# Patient Record
Sex: FEMALE | Race: WHITE | NOT HISPANIC OR LATINO | Employment: OTHER | URBAN - METROPOLITAN AREA
[De-identification: names, ages, dates, MRNs, and addresses within clinical notes are randomized per-mention and may not be internally consistent; named-entity substitution may affect disease eponyms.]

---

## 2017-01-12 ENCOUNTER — OFFICE VISIT - HEALTHEAST (OUTPATIENT)
Dept: CARDIOLOGY | Facility: CLINIC | Age: 82
End: 2017-01-12

## 2017-01-12 DIAGNOSIS — E78.2 MIXED HYPERLIPIDEMIA: ICD-10-CM

## 2017-01-12 DIAGNOSIS — I10 ESSENTIAL HYPERTENSION: ICD-10-CM

## 2017-01-12 DIAGNOSIS — I25.10 CORONARY ARTERY DISEASE INVOLVING NATIVE CORONARY ARTERY OF NATIVE HEART WITHOUT ANGINA PECTORIS: ICD-10-CM

## 2017-01-12 ASSESSMENT — MIFFLIN-ST. JEOR: SCORE: 1259.1

## 2017-06-09 ASSESSMENT — MIFFLIN-ST. JEOR: SCORE: 1233.25

## 2017-06-10 ENCOUNTER — ANESTHESIA - HEALTHEAST (OUTPATIENT)
Dept: SURGERY | Facility: HOSPITAL | Age: 82
End: 2017-06-10

## 2017-06-10 ENCOUNTER — SURGERY - HEALTHEAST (OUTPATIENT)
Dept: SURGERY | Facility: HOSPITAL | Age: 82
End: 2017-06-10

## 2017-06-14 ENCOUNTER — OFFICE VISIT - HEALTHEAST (OUTPATIENT)
Dept: GERIATRICS | Facility: CLINIC | Age: 82
End: 2017-06-14

## 2017-06-14 DIAGNOSIS — H35.30 MACULAR DEGENERATION: ICD-10-CM

## 2017-06-14 DIAGNOSIS — S72.001D CLOSED FRACTURE OF RIGHT HIP WITH ROUTINE HEALING: ICD-10-CM

## 2017-06-14 DIAGNOSIS — K59.00 CONSTIPATION, UNSPECIFIED CONSTIPATION TYPE: ICD-10-CM

## 2017-06-14 DIAGNOSIS — E78.5 DYSLIPIDEMIA: ICD-10-CM

## 2017-06-14 DIAGNOSIS — D64.9 ANEMIA, UNSPECIFIED TYPE: ICD-10-CM

## 2017-06-14 DIAGNOSIS — I10 ESSENTIAL HYPERTENSION: ICD-10-CM

## 2017-06-14 DIAGNOSIS — E11.8 TYPE 2 DIABETES MELLITUS WITH COMPLICATION, UNSPECIFIED LONG TERM INSULIN USE STATUS: ICD-10-CM

## 2017-06-14 DIAGNOSIS — I25.810 CORONARY ARTERY DISEASE INVOLVING CORONARY BYPASS GRAFT OF NATIVE HEART WITHOUT ANGINA PECTORIS: ICD-10-CM

## 2017-06-16 ENCOUNTER — OFFICE VISIT - HEALTHEAST (OUTPATIENT)
Dept: GERIATRICS | Facility: CLINIC | Age: 82
End: 2017-06-16

## 2017-06-16 DIAGNOSIS — S72.001D CLOSED FRACTURE OF RIGHT HIP WITH ROUTINE HEALING: ICD-10-CM

## 2017-06-16 DIAGNOSIS — D64.9 ANEMIA, UNSPECIFIED TYPE: ICD-10-CM

## 2017-06-16 DIAGNOSIS — I10 ESSENTIAL HYPERTENSION: ICD-10-CM

## 2017-06-20 ENCOUNTER — OFFICE VISIT - HEALTHEAST (OUTPATIENT)
Dept: GERIATRICS | Facility: CLINIC | Age: 82
End: 2017-06-20

## 2017-06-20 DIAGNOSIS — Z95.1 S/P CABG X 3: ICD-10-CM

## 2017-06-20 DIAGNOSIS — I10 ESSENTIAL HYPERTENSION: ICD-10-CM

## 2017-06-20 DIAGNOSIS — S72.001D CLOSED FRACTURE OF RIGHT HIP WITH ROUTINE HEALING: ICD-10-CM

## 2017-06-20 DIAGNOSIS — E11.8 TYPE 2 DIABETES MELLITUS WITH COMPLICATION, UNSPECIFIED LONG TERM INSULIN USE STATUS: ICD-10-CM

## 2017-06-23 ENCOUNTER — OFFICE VISIT - HEALTHEAST (OUTPATIENT)
Dept: GERIATRICS | Facility: CLINIC | Age: 82
End: 2017-06-23

## 2017-06-23 DIAGNOSIS — I25.810 CORONARY ARTERY DISEASE INVOLVING CORONARY BYPASS GRAFT OF NATIVE HEART WITHOUT ANGINA PECTORIS: ICD-10-CM

## 2017-06-23 DIAGNOSIS — D64.9 ANEMIA, UNSPECIFIED TYPE: ICD-10-CM

## 2017-06-23 DIAGNOSIS — I10 ESSENTIAL HYPERTENSION WITH GOAL BLOOD PRESSURE LESS THAN 140/90: ICD-10-CM

## 2017-06-23 DIAGNOSIS — S72.001D CLOSED FRACTURE OF RIGHT HIP WITH ROUTINE HEALING: ICD-10-CM

## 2017-06-27 ENCOUNTER — OFFICE VISIT - HEALTHEAST (OUTPATIENT)
Dept: GERIATRICS | Facility: CLINIC | Age: 82
End: 2017-06-27

## 2017-06-27 DIAGNOSIS — E11.8 TYPE 2 DIABETES MELLITUS WITH COMPLICATION, UNSPECIFIED LONG TERM INSULIN USE STATUS: ICD-10-CM

## 2017-06-27 DIAGNOSIS — I25.810 CORONARY ARTERY DISEASE INVOLVING CORONARY BYPASS GRAFT OF NATIVE HEART WITHOUT ANGINA PECTORIS: ICD-10-CM

## 2017-06-27 DIAGNOSIS — S72.001D CLOSED FRACTURE OF RIGHT HIP WITH ROUTINE HEALING: ICD-10-CM

## 2017-06-27 DIAGNOSIS — R53.1 WEAKNESS: ICD-10-CM

## 2017-06-30 ENCOUNTER — OFFICE VISIT - HEALTHEAST (OUTPATIENT)
Dept: GERIATRICS | Facility: CLINIC | Age: 82
End: 2017-06-30

## 2017-06-30 DIAGNOSIS — R53.1 WEAKNESS: ICD-10-CM

## 2017-06-30 DIAGNOSIS — I10 ESSENTIAL HYPERTENSION WITH GOAL BLOOD PRESSURE LESS THAN 140/90: ICD-10-CM

## 2017-06-30 DIAGNOSIS — D64.9 ANEMIA, UNSPECIFIED TYPE: ICD-10-CM

## 2017-06-30 DIAGNOSIS — E11.8 TYPE 2 DIABETES MELLITUS WITH COMPLICATION, UNSPECIFIED LONG TERM INSULIN USE STATUS: ICD-10-CM

## 2017-06-30 DIAGNOSIS — I25.810 CORONARY ARTERY DISEASE INVOLVING CORONARY BYPASS GRAFT OF NATIVE HEART WITHOUT ANGINA PECTORIS: ICD-10-CM

## 2017-06-30 DIAGNOSIS — S72.001D CLOSED FRACTURE OF RIGHT HIP WITH ROUTINE HEALING: ICD-10-CM

## 2017-07-03 ENCOUNTER — AMBULATORY - HEALTHEAST (OUTPATIENT)
Dept: GERIATRICS | Facility: CLINIC | Age: 82
End: 2017-07-03

## 2017-08-14 ENCOUNTER — RECORDS - HEALTHEAST (OUTPATIENT)
Dept: LAB | Facility: CLINIC | Age: 82
End: 2017-08-14

## 2017-08-14 LAB
CHOLEST SERPL-MCNC: 145 MG/DL
FASTING STATUS PATIENT QL REPORTED: NO
HDLC SERPL-MCNC: 37 MG/DL
LDLC SERPL CALC-MCNC: 33 MG/DL
TRIGL SERPL-MCNC: 373 MG/DL

## 2018-01-11 ENCOUNTER — COMMUNICATION - HEALTHEAST (OUTPATIENT)
Dept: ADMINISTRATIVE | Facility: CLINIC | Age: 83
End: 2018-01-11

## 2018-02-08 ENCOUNTER — RECORDS - HEALTHEAST (OUTPATIENT)
Dept: ADMINISTRATIVE | Facility: OTHER | Age: 83
End: 2018-02-08

## 2018-02-08 ENCOUNTER — AMBULATORY - HEALTHEAST (OUTPATIENT)
Dept: CARDIOLOGY | Facility: CLINIC | Age: 83
End: 2018-02-08

## 2018-04-04 ENCOUNTER — RECORDS - HEALTHEAST (OUTPATIENT)
Dept: LAB | Facility: CLINIC | Age: 83
End: 2018-04-04

## 2018-04-04 LAB
ANION GAP SERPL CALCULATED.3IONS-SCNC: 13 MMOL/L (ref 5–18)
BUN SERPL-MCNC: 14 MG/DL (ref 8–28)
CALCIUM SERPL-MCNC: 9.8 MG/DL (ref 8.5–10.5)
CHLORIDE BLD-SCNC: 106 MMOL/L (ref 98–107)
CO2 SERPL-SCNC: 23 MMOL/L (ref 22–31)
CREAT SERPL-MCNC: 0.96 MG/DL (ref 0.6–1.1)
GFR SERPL CREATININE-BSD FRML MDRD: 55 ML/MIN/1.73M2
GLUCOSE BLD-MCNC: 117 MG/DL (ref 70–125)
POTASSIUM BLD-SCNC: 4.7 MMOL/L (ref 3.5–5)
SODIUM SERPL-SCNC: 142 MMOL/L (ref 136–145)

## 2018-06-12 ENCOUNTER — AMBULATORY - HEALTHEAST (OUTPATIENT)
Dept: NEUROLOGY | Facility: CLINIC | Age: 83
End: 2018-06-12

## 2018-06-12 DIAGNOSIS — R41.3 DISTURBANCE OF MEMORY: ICD-10-CM

## 2018-07-18 ENCOUNTER — HOSPITAL ENCOUNTER (OUTPATIENT)
Dept: NEUROLOGY | Facility: CLINIC | Age: 83
Setting detail: THERAPIES SERIES
Discharge: STILL ADMITTED OR ADMINISTRATIVE | End: 2018-07-18
Attending: PHYSICIAN ASSISTANT

## 2018-07-18 DIAGNOSIS — F43.22 ADJUSTMENT DISORDER WITH ANXIETY: ICD-10-CM

## 2018-07-18 DIAGNOSIS — R41.3 DISTURBANCE OF MEMORY: ICD-10-CM

## 2018-08-06 ENCOUNTER — HOSPITAL ENCOUNTER (OUTPATIENT)
Dept: NEUROLOGY | Facility: CLINIC | Age: 83
Setting detail: THERAPIES SERIES
Discharge: STILL ADMITTED OR ADMINISTRATIVE | End: 2018-08-06
Attending: PHYSICIAN ASSISTANT

## 2018-08-06 DIAGNOSIS — R41.3 MEMORY DISTURBANCE: ICD-10-CM

## 2018-09-06 ENCOUNTER — RECORDS - HEALTHEAST (OUTPATIENT)
Dept: LAB | Facility: CLINIC | Age: 83
End: 2018-09-06

## 2018-09-06 LAB
CHOLEST SERPL-MCNC: 193 MG/DL
FASTING STATUS PATIENT QL REPORTED: NO
HDLC SERPL-MCNC: 47 MG/DL
LDLC SERPL CALC-MCNC: 73 MG/DL
TRIGL SERPL-MCNC: 363 MG/DL

## 2021-05-24 ENCOUNTER — RECORDS - HEALTHEAST (OUTPATIENT)
Dept: ADMINISTRATIVE | Facility: CLINIC | Age: 86
End: 2021-05-24

## 2021-05-25 ENCOUNTER — RECORDS - HEALTHEAST (OUTPATIENT)
Dept: ADMINISTRATIVE | Facility: CLINIC | Age: 86
End: 2021-05-25

## 2021-05-26 ENCOUNTER — RECORDS - HEALTHEAST (OUTPATIENT)
Dept: ADMINISTRATIVE | Facility: CLINIC | Age: 86
End: 2021-05-26

## 2021-05-27 ENCOUNTER — RECORDS - HEALTHEAST (OUTPATIENT)
Dept: ADMINISTRATIVE | Facility: CLINIC | Age: 86
End: 2021-05-27

## 2021-05-28 ENCOUNTER — RECORDS - HEALTHEAST (OUTPATIENT)
Dept: ADMINISTRATIVE | Facility: CLINIC | Age: 86
End: 2021-05-28

## 2021-05-30 ENCOUNTER — RECORDS - HEALTHEAST (OUTPATIENT)
Dept: ADMINISTRATIVE | Facility: CLINIC | Age: 86
End: 2021-05-30

## 2021-05-30 VITALS — HEIGHT: 67 IN | BODY MASS INDEX: 28.25 KG/M2 | WEIGHT: 180 LBS

## 2021-05-31 VITALS — WEIGHT: 176.2 LBS | BODY MASS INDEX: 27.6 KG/M2

## 2021-05-31 VITALS — BODY MASS INDEX: 27.36 KG/M2 | WEIGHT: 174.3 LBS | HEIGHT: 67 IN

## 2021-05-31 VITALS — WEIGHT: 178.8 LBS | BODY MASS INDEX: 28 KG/M2

## 2021-05-31 VITALS — BODY MASS INDEX: 28.04 KG/M2 | WEIGHT: 179 LBS

## 2021-05-31 VITALS — WEIGHT: 184.8 LBS | BODY MASS INDEX: 28.94 KG/M2

## 2021-05-31 VITALS — BODY MASS INDEX: 27.82 KG/M2 | WEIGHT: 177.6 LBS

## 2021-05-31 VITALS — HEIGHT: 67 IN | BODY MASS INDEX: 27.3 KG/M2

## 2021-06-08 NOTE — PROGRESS NOTES
[facility] Progress Note    Assessment:    1.  Coronary artery disease with no anginal symptoms reported  2.  Essential hypertension controlled  3.  Hyperlipidemia under statin therapy    Plan:    Would discontinue the patient's Plavix therapy at this time.  I've made no other changes in her current medical regimen.  Would like SAMUEL Banuelos in follow-up in one year with no interim cardiac testing ordered    An After Visit Summary was printed and given to the patient.    Subjective:    88-year-old female who underwent three-vessel coronary artery bypass surgery in December 2014.  She did develop some postoperative atrial fibrillation and has not reoccurred.  Patient had repeat coronary angiography performed in November 2015 with all 3 grafts being widely patent.  She had stents placed in her left main and proximal left anterior descending artery.  Over last year she's had no further chest pain or use of sublingual nitroglycerin.  The patient has fallen earlier this summer but denies any new cardiovascular symptoms    Problem List:    Patient Active Problem List   Diagnosis     CAD (coronary artery disease)     DM (diabetes mellitus)     HTN (hypertension)     Dyslipidemia     NSTEMI (non-ST elevated myocardial infarction)     Abnormal nuclear stress test     S/P CABG x 3     Anemia     Exertional chest pain     Weakness     Malignant hypertension     Recurrent falls while walking     Carotid stenosis     Constipation     Glaucoma     Macular degeneration         Current Outpatient Prescriptions   Medication Sig Dispense Refill     acetaminophen (TYLENOL EXTRA STRENGTH) 500 MG tablet Take 1,000 mg by mouth every 6 (six) hours as needed for pain.        amLODIPine (NORVASC) 10 MG tablet Take 1 tablet (10 mg total) by mouth every evening. 30 tablet 0     aspirin 81 MG EC tablet Take 81 mg by mouth daily.       atorvastatin (LIPITOR) 20 MG tablet Take 1 tablet by mouth daily.  11     CALCIUM CARBONATE/VITAMIN  D3 (CALCIUM 600 + D,3, ORAL) Take 1 tablet by mouth 2 (two) times a day with meals.       clopidogrel (PLAVIX) 75 mg tablet 75 mg daily.        coQ10, ubiquinol, 100 mg cap Take 1 capsule by mouth daily.       ferrous sulfate 325 (65 FE) MG tablet Take 325 mg by mouth 2 (two) times a day with meals.        GLUCOSAMINE HCL-VITAMIN D3 ORAL Take 1 tablet by mouth 2 (two) times a day with meals. (1g-100 units)       latanoprost (XALATAN) 0.005 % ophthalmic solution Administer 1 drop to both eyes bedtime.        metFORMIN (GLUCOPHAGE) 1000 MG tablet Take 1,000 mg by mouth 2 (two) times a day with meals.        metoprolol tartrate (LOPRESSOR) 50 MG tablet Take 50 mg by mouth 2 (two) times a day.       nitroglycerin (NITROSTAT) 0.4 MG SL tablet Place 0.4 mg under the tongue every 5 (five) minutes as needed for chest pain.       polyvinyl alcohol (LIQUIFILM TEARS) 1.4 % ophthalmic solution Administer 1 drop to both eyes as needed for dry eyes.       ranitidine (ZANTAC) 300 MG capsule Take 300 mg by mouth every evening.       senna-docusate (PERICOLACE) 8.6-50 mg tablet Take 1 tablet by mouth daily.       valsartan (DIOVAN) 160 MG tablet Take 1 tablet (160 mg total) by mouth every 12 (twelve) hours. 60 tablet 0     VIT A/VIT C/VIT E/ZINC/COPPER (ICAPS AREDS ORAL) Take 1 tablet by mouth 2 (two) times a day.        atorvastatin (LIPITOR) 40 MG tablet Take 40 mg by mouth bedtime.       cholecalciferol, vitamin D3, (VITAMIN D3) 1,000 unit capsule Take 1,000 Units by mouth daily.       No current facility-administered medications for this visit.        .  Past Surgical History   Procedure Laterality Date     Coronary artery bypass graft N/A 12/3/2014     Procedure: CORONARY ARTERY BYPASS X 3 WITH INTERNAL MAMMARY ARTERY, ENDOSCOPIC SAPHENOUS VEIN HARVEST ANESTHESIA TRANSESOPHAGEAL ECHOCARDIOGRAM ( Rm 4036 );  Surgeon: Nathalia Douglas MD;  Location: [facility];  Service:      Cataract extraction         .  Social  "History     Social History     Marital status: Single     Spouse name: N/A     Number of children: N/A     Years of education: N/A     Occupational History     Not on file.     Social History Main Topics     Smoking status: Never Smoker     Smokeless tobacco: Not on file     Alcohol use No     Drug use: No     Sexual activity: Not on file     Other Topics Concern     Not on file     Social History Narrative       .No family history on file.  Review of Systems:  General: WNL  Eyes: WNL  Ears/Nose/Throat: WNL  Lungs: WNL  Heart: Arm Pain  Stomach: WNL  Bladder: Frequent Urination at Night  Muscle/Joints: WNL  Skin: WNL  Nervous System: Falls  Mental Health: WNL     Blood: WNL    Objective:     Visit Vitals     /70 (Patient Site: Left Arm, Patient Position: Sitting, Cuff Size: Adult Regular)     Pulse 76     Resp 16     Ht 5' 7\" (1.702 m)     Wt 180 lb (81.6 kg)     BMI 28.19 kg/m2     180 lb (81.6 kg)   [unfilled]    Physical Exam:    GENERAL APPEARANCE: alert, no apparent distress  HEENT: no scleral icterus or xanthelasma  NECK: jugular venous pressure normal  CHEST: symmetric, the lungs were clear to auscultation  CARDIOVASCULAR: regular rhythm without murmur, click, or gallop; no carotid bruits  ABDOMEN: nondistended, nontender, bowel sounds present  EXTREMITIES: no cyanosis, clubbing or edema.    Cardiac Testing:    Coronary angiography November 10, 2015 results reviewed as above    Lab Results:    LIPIDS:  Lab Results   Component Value Date    CHOL 149 09/12/2016    CHOL 95 06/10/2016    CHOL 182 07/17/2015     Lab Results   Component Value Date    HDL 45 (L) 09/12/2016    HDL 36 (L) 06/10/2016    HDL 42 07/17/2015     Lab Results   Component Value Date    LDLCALC 52 09/12/2016    LDLCALC 13 06/10/2016    LDLCALC  07/17/2015      Comment:      Invalid, Triglycerides >300     Lab Results   Component Value Date    TRIG 262 (H) 09/12/2016    TRIG 228 (H) 06/10/2016    TRIG 306 (H) 07/17/2015     No " components found for: CHOLHDL    BMP:  Lab Results   Component Value Date    CREATININE 1.04 06/10/2016    BUN 16 06/10/2016     06/10/2016    K 4.6 06/10/2016     06/10/2016    CO2 22 06/10/2016         Cheo Stark MD,F.A.C.C.  [facility]  [phone]

## 2021-06-11 NOTE — PROGRESS NOTES
"Code Status:  DNR/DNI  Visit Type: H & P     Facility:  [facility] [[id number]]      Facility Type: SNF (Skilled Nursing Facility, TCU)    History of Present Illness:   Hospital Admission Date: 6/9/17 Hospital Discharge Date: 6/13/17  Facility Admission Date: 6/13/17    Dunia Forbes is a 88 y.o. female with history of CAD s/p CABG x3 vessels and stent, hypertension, hyperlipidemia, diabetes mellitus type 2, hyperlipidemia who is being admitted s/p a fall with subsequent closed right femoral neck fracture s/p right hip hemiarthorplasty on 6/10/17. Patient was shopping at a grocery store and was pushing her cart on the sidewalk when she tripped and fell onto her right hip. Had right hip pain at that time and was brought in where she was found to have a right femoral neck fracture. The hemiarthroplasty was uncomplicated, as was the hospital course. Right shoulders and head/cervical spine imaging did not demonstrate acute findings. Patient is able to recall the events. At this time, patient has no complaint. Overnight, she had trouble falling asleep on the recliner. She usually sleeps on the recliner at home. She did not know what kept her up. She did not have much pain specifically. This morning, she was a little \"cranky\" but after getting tylenol and some of the stronger pain medication, she felt much better. She was able to attend PT this morning and just went to the chapel with her niece. Patient is eating and drinking well. No nausea or vomiting noted. Has some constipation at baseline and would like to take something on a regular basis. She has trouble controlling her urine. She uses adult diapers on a daily basis.     Additional Geriatric ROS: Patient lives by herself in her own apartment. Her nephew took care of her for a long time and then passed away a few weeks ago. Her other nephew has been helping out at home. She has nieces around as well. Prior to the fall, she was mobile and able to " drive. She could do most ADLs. She can also drive.       Past Medical History:   Diagnosis Date     Altered mental status      CAD (coronary artery disease) 11/30/2014     Diabetes mellitus      DM (diabetes mellitus) 11/30/2014     Dyslipidemia 11/30/2014     GERD (gastroesophageal reflux disease)      Glaucoma      HLD (hyperlipidemia) 11/30/2014     HTN (hypertension) 11/30/2014     Hyperlipidemia      Macular degeneration      Past Surgical History:   Procedure Laterality Date     CATARACT EXTRACTION       CORONARY ARTERY BYPASS GRAFT N/A 12/3/2014    Procedure: CORONARY ARTERY BYPASS X 3 WITH INTERNAL MAMMARY ARTERY, ENDOSCOPIC SAPHENOUS VEIN HARVEST ANESTHESIA TRANSESOPHAGEAL ECHOCARDIOGRAM ( Rm 4036 );  Surgeon: Nathalia Douglas MD;  Location: [facility];  Service:      OR PARTIAL HIP REPLACEMENT Right 6/10/2017    Procedure: RIGHT HIP HEMIARTHROPLASTY;  Surgeon: Fabian Cortez MD;  Location: [facility];  Service: Orthopedics     No family history on file.  Social History     Social History     Marital status: Single     Spouse name: N/A     Number of children: N/A     Years of education: N/A     Occupational History     Not on file.     Social History Main Topics     Smoking status: Never Smoker     Smokeless tobacco: Not on file     Alcohol use No     Drug use: No     Sexual activity: Not on file     Other Topics Concern     Not on file     Social History Narrative     Current Outpatient Prescriptions   Medication Sig Dispense Refill     acetaminophen (TYLENOL) 500 MG tablet Take 2 tablets (1,000 mg total) by mouth 3 (three) times a day for 10 days. 30 tablet 0     amLODIPine (NORVASC) 10 MG tablet Take 1 tablet (10 mg total) by mouth every evening. 30 tablet 0     aspirin 325 MG tablet Take 1 tablet (325 mg total) by mouth 2 (two) times a day. 60 tablet 0     atorvastatin (LIPITOR) 20 MG tablet Take 1 tablet by mouth daily.  11     CALCIUM CARBONATE/VITAMIN D3 (CALCIUM 600 + D,3,  ORAL) Take 1 tablet by mouth 2 (two) times a day with meals.       CARBOXYMETHYLCELLULOS/GLYCERIN (REFRESH OPTIVE OPHT) Apply 1 drop to eye as needed (dry eyes).       coQ10, ubiquinol, 100 mg cap Take 1 capsule by mouth 2 (two) times a day.        ferrous sulfate 325 (65 FE) MG tablet Take 325 mg by mouth 2 (two) times a day with meals.        GLUCOSAMINE HCL-VITAMIN D3 ORAL Take 1 tablet by mouth 2 (two) times a day with meals. (1g-100 units)       latanoprost (XALATAN) 0.005 % ophthalmic solution Administer 1 drop to both eyes bedtime.        magnesium oxide (MAG-OX) 400 mg tablet Take 1 tablet (400 mg total) by mouth 2 (two) times a day.  0     metFORMIN (GLUCOPHAGE) 1000 MG tablet Take 1,000 mg by mouth 2 (two) times a day with meals.        metoprolol tartrate (LOPRESSOR) 50 MG tablet Take 50 mg by mouth 2 (two) times a day.       nitroglycerin (NITROSTAT) 0.4 MG SL tablet Place 0.4 mg under the tongue every 5 (five) minutes as needed for chest pain.       oxyCODONE (ROXICODONE) 5 MG immediate release tablet Take 1-2 tablets (5-10 mg total) by mouth every 4 (four) hours as needed. 60 tablet 0     ranitidine (ZANTAC) 300 MG capsule Take 300 mg by mouth every evening.       senna-docusate (PERICOLACE) 8.6-50 mg tablet Take 1 tablet by mouth 2 (two) times a day as needed for constipation.  0     valsartan (DIOVAN) 160 MG tablet Take 1 tablet (160 mg total) by mouth every 12 (twelve) hours. 60 tablet 0     VIT C/E/ZN/COPPR/LUTEIN/ZEAXAN (PRESERVISION AREDS 2 ORAL) Take 1 capsule by mouth 2 (two) times a day.       No current facility-administered medications for this visit.      Allergies   Allergen Reactions     Amitriptyline Itching     Chondroitin Analogues      Knee pain     Lipitor [Atorvastatin] Itching     Lisinopril Cough     Pravachol [Pravastatin] Itching       There is no immunization history on file for this patient.      Review of Systems   Gen: denies fevers, chills  HEENT: denies headaches, vision  "changes.   Respiratory: denies shortness of breath, wheezing  Chest: denies chest pain  Abdomen: denies abdominal pain, nausea, vomiting, diarrhea. Has a history of constipation.   Extremities: Right hip surgery. Intermittent swelling in both legs.   Neurological: Denies changes in strength or sensations.  Integumentary: no rash or lesions noted.     BP (!) 181/68  Pulse 83  Temp 99.1  F (37.3  C)  Resp 20  Ht 5' 7\" (1.702 m)  SpO2 97%  Physical Exam  Gen: NAD. Alert, oriented. Cooperative.   HEENT: Normocephalic, atraumatic. Pupils equal, sclera clear. No nasal drainage. Moist mucous membranes.   Cardiac: RRR. S1, S2 present. No murmur, rub, gallop.   Respiratory: CTAB. No wheeze, rales, rhonchi.   Abdomen: Soft, nontender. Bowel sounds present. No masses, rebound, guarding.  Extremities: Warm. Right hip: bandaged is in place over right lateral hip. The area appears clean, dry, and intact. No increased swelling, erythema, or discharge noted. Extension at the knee is normal. Sensations to light touch intact in all extremities. There is mild pitting edema of the lower extremities bilaterally.   Integumentary: No concerning rash or lesions.    Labs:  All labs reviewed in the nursing home record. Imaging impressions reviewed in the [facility] chart.     Assessment:  1. Closed fracture of right hip with routine healing     2. Coronary artery disease involving coronary bypass graft of native heart without angina pectoris     3. Type 2 diabetes mellitus with complication, unspecified long term insulin use status     4. Essential hypertension     5. Dyslipidemia     6. Anemia, unspecified type     7. Macular degeneration     8. Constipation, unspecified constipation type         Plan:     Right femoral neck fracture, closed s/p right hip hemiarthroplasty: doing well post-operatively. Working well with PT. Pain controlled. There was mild degenerative changes noted in the hip/femoral bone during imaging.   - " Acetaminophen 1000 mg TID for pain x10 days  - Oxycodone 10mg tablet every 4 hours as needed for pain  - Aspirin 325mg BID (DVT prophylaxis) to be completed on 7/9/17. At that point, transition to aspirin 81mg daily.  - Hold glucosamine capsule while on aspirin  - Vitamin D3 2,000 units by once daily  - Calcium-Magnesium tablet 500-250 1 tablet two times a day    Hypertension: overall stable. Has periods of BP elevation to 180s/60s, though most measurements are in the 110-130s range.   - Valsartan 160mg BID, amlodipine 10mg QHS, metoprolol tartrate 50mg BID  - Goal BP < 160/90    DMT2: A1c=6.8 on 6/10/17.  - Metformin 1,000mg BID  - Check glucoses before breakfast/dinner alternating with before lunch/bedtime for three days. Call MD if glucose>300.     Anemia, normocytic, normochromic: Present at least since 2014 from chart review. Recent decrease likely secondary to blood loss anemia.  - Continue ferrous sulfate  - Recheck Hgb on 6/16/17    Constipation: chronic issue. Likely to worsen on narcotics  - Scheduled senna-docusate 8.6-50mg 1 tablet BID  - Senna-docusate sodium 1 tablet BID  as needed.    GERD: stable  - Ranitidine 300mg QHS    CAD s/p CABG, Hyperlipidemia: stable.  - Nitroglycerin 0.4mg sublingual tablet PRN chest pain  - Atorvastatin 20mg QHS  - CoQ10 capsule 100mg BID    Glaucoma:   - Latanoprost 0.005% solution 1 drop in both eyes at bedtime  - Refresh Optive solution 0.5-0.9% give 1 drop in both eyes as needed for dry eyes.    Total 60 minutes of which 55% was spent in counseling and coordination of care of the above plan.    The patient was seen and discussed with Dr. Hager.  She was discussed chart was reviewed and patient was examined with Dr. Jose Antonio Carrera third-year family practice resident and I agree with his assessment and plan. MD Jose Antonio Talbot MD  [facility], PGY-3  6/14/2017 9:21 PM    Electronically signed by: Tommie Hager MD

## 2021-06-11 NOTE — ANESTHESIA POSTPROCEDURE EVALUATION
Patient: Dunia Forbes  RIGHT HIP HEMIARTHROPLASTY  Anesthesia type: general    Patient location: PACU  Last vitals:   Vitals:    06/10/17 1540   BP: 180/86   Pulse: 97   Resp: 20   Temp: 36.7  C (98  F)   SpO2: 95%     Post vital signs: stable  Level of consciousness: awake and responds to simple questions  Post-anesthesia pain: pain controlled  Post-anesthesia nausea and vomiting: no  Pulmonary: unassisted, return to baseline  Cardiovascular: stable and blood pressure at baseline  Hydration: adequate  Anesthetic events: no    QCDR Measures:  ASA# 11 - Daisha-op Cardiac Arrest: ASA11B - Patient did NOT experience unanticipated cardiac arrest  ASA# 12 - Daisha-op Mortality Rate: ASA12B - Patient did NOT die  ASA# 13 - PACU Re-Intubation Rate: ASA13B - Patient did NOT require a new airway mgmt  ASA# 10 - Composite Anes Safety: ASA10A - No serious adverse event  ASA# 38 - New Corneal Injury: ASA38A - No new exposure keratitis or corneal abrasion in PACU    Additional Notes:

## 2021-06-11 NOTE — PROGRESS NOTES
[facility]    Facility:   [facility] [[id number]]   Code Status: DNR/DNI      CHIEF COMPLAINT/REASON FOR VISIT:  Chief Complaint   Patient presents with     Problem Visit       HISTORY:      HPI: Dunia is a 88 y.o. female who is here due to a fall and a right hip fracture s/p right hip hemiarthroplasty. Today, patient is frustrated that when she woke up at 5:30 and called to be dressed, no one came in. When she was told that she would get a bath at 6 AM, she did not get one until 8 AM. She does not like the food here much. She has been sleeping in the recliner and is able to sleep okay. Pain from the fracture is currently well controlled. She has been working with PT/OT and is doing well. She feels that she is getting stronger. She would like this to improve as fast as possible as she desires to return home and to baseline function. Discussed anticipated course and encouraged patient to continue to strengthen her leg. Discussed importance of staying safe and asking for help in the short term due to the weakness. Patient expressed understanding (though she did not like it much). Denies fevers, chills, nausea, vomiting. Eating and drinking well. No changes in urine/BM habits.     Past Medical History:   Diagnosis Date     Altered mental status      CAD (coronary artery disease) 11/30/2014     Diabetes mellitus      DM (diabetes mellitus) 11/30/2014     Dyslipidemia 11/30/2014     GERD (gastroesophageal reflux disease)      Glaucoma      HLD (hyperlipidemia) 11/30/2014     HTN (hypertension) 11/30/2014     Hyperlipidemia      Macular degeneration              No family history on file.  Social History     Social History     Marital status: Single     Spouse name: N/A     Number of children: N/A     Years of education: N/A     Social History Main Topics     Smoking status: Never Smoker     Smokeless tobacco: Not on file     Alcohol use No     Drug use: No     Sexual activity: Not  on file     Other Topics Concern     Not on file     Social History Narrative         Review of Systems   HENT: Negative.    Respiratory: Negative.    Cardiovascular: Positive for leg swelling (baseline). Negative for chest pain and palpitations.   Gastrointestinal: Negative.    Genitourinary: Negative.         Difficulties with incontinence at times. Unchanged from baseline.   Musculoskeletal: Positive for arthralgias (right hip pain intermittently).   Skin: Positive for wound (RIght hip).   Neurological: Positive for weakness (RLE. Improving). Negative for dizziness, light-headedness, numbness and headaches.   Psychiatric/Behavioral: Negative.      .  Vitals:    06/16/17 1035   BP: 160/68   Pulse: 99   Resp: 18   Temp: 99.4  F (37.4  C)   SpO2: 97%   Weight: 184 lb 12.8 oz (83.8 kg)       Physical Exam   Constitutional: She is oriented to person, place, and time. She appears well-developed and well-nourished.   Nontoxic appearing   HENT:   Head: Normocephalic and atraumatic.   Mouth/Throat: Oropharynx is clear and moist. No oropharyngeal exudate.   Eyes: Conjunctivae and EOM are normal. No scleral icterus.   Neck: Normal range of motion. No tracheal deviation present.   Cardiovascular: Normal rate, normal heart sounds and intact distal pulses.  Exam reveals no gallop and no friction rub.    No murmur heard.  Pulmonary/Chest: Effort normal. No stridor. No respiratory distress. She has no wheezes. She has no rales.   Abdominal: Soft. Bowel sounds are normal. She exhibits no distension. There is no guarding.   Musculoskeletal: She exhibits edema (bilateral +1-+2/+4 pitting edema bilaterally).   Right hip: surgical site is CDI. No signs of infection.   Neurological: She is alert and oriented to person, place, and time. No cranial nerve deficit.   Skin: Skin is warm and dry. She is not diaphoretic. No erythema.   Psychiatric: She has a normal mood and affect. Her behavior is normal. Thought content normal.          LABS:   Results for SRI TODD (MRN [id number]) as of 6/16/2017 10:36   Ref. Range 6/13/2017 05:49   Sodium Latest Ref Range: 136 - 145 mmol/L 138   Potassium Latest Ref Range: 3.5 - 5.0 mmol/L 3.7   Chloride Latest Ref Range: 98 - 107 mmol/L 105   CO2 Latest Ref Range: 22 - 31 mmol/L 25   Anion Gap, Calculation Latest Ref Range: 5 - 18 mmol/L 8   BUN Latest Ref Range: 8 - 28 mg/dL 12   Creatinine Latest Ref Range: 0.60 - 1.10 mg/dL 0.82   GFR MDRD Af Amer Latest Ref Range: >60 mL/min/1.73m2 >60   GFR MDRD Non Af Amer Latest Ref Range: >60 mL/min/1.73m2 >60   Calcium Latest Ref Range: 8.5 - 10.5 mg/dL 9.1   Magnesium Latest Ref Range: 1.8 - 2.6 mg/dL 1.9   Glucose Latest Ref Range: 70 - 125 mg/dL 143 (H)   INR Latest Ref Range: 0.90 - 1.10  1.18 (H)   WBC Latest Ref Range: 4.0 - 11.0 thou/uL 9.8   RBC Latest Ref Range: 3.80 - 5.40 mill/uL 3.22 (L)   Hemoglobin Latest Ref Range: 12.0 - 16.0 g/dL 9.6 (L)   Hematocrit Latest Ref Range: 35.0 - 47.0 % 29.2 (L)   MCV Latest Ref Range: 80 - 100 fL 91   MCH Latest Ref Range: 27.0 - 34.0 pg 29.8   MCHC Latest Ref Range: 32.0 - 36.0 g/dL 32.9   RDW Latest Ref Range: 11.0 - 14.5 % 14.8 (H)   Platelets Latest Ref Range: 140 - 440 thou/uL 182   MPV Latest Ref Range: 8.5 - 12.5 fL 10.4       ASSESSMENT:      ICD-10-CM    1. Closed fracture of right hip with routine healing S72.001D    2. Essential hypertension I10    3. Anemia, unspecified type D64.9        PLAN:    Pain is well controlled at this time. Working well with PT and OT. Feels like she is getting stronger compared to when she first came in. She is hopeful to return to baseline function. BP have elevations at time, but overall appropriate. Continue to have goal BP<160/90. The anemia (normocytic) appears to be stable. Etiology uncertain. No colonoscopy noted in [facility] chart as patient's PCP is with [facility]. Continue iron replacement at this time. Recheck CBC on 6/20/17. In regards to patient's temperatures, they appear  stable. Highest temp=100.3F (6/14/17). Patient feels well and exhibits no signs of infection. WBC=9.8 from 6/13/17 (was previously elevated to 13.7 on 6/10/17). Continue to monitor at this time.     Total 15 minutes of which 55% was spent counseling and coordination of care of the above plan.    Electronically signed by: Tommie Hager MD Faculty Supervision of Residents    I have examined this patient and the medical care has been evaluated and discussed with the resident.  The documentation has been reviewed.  I agree with the medical care provided and confirm the findings.         I have examined the patient and documentation has been reviewed,  Tommie Hager MD  .    Tommie Hager

## 2021-06-11 NOTE — PROGRESS NOTES
Code Status:  DNR/DNI  Visit Type: Discharge Summary     Facility:  [facility] [[id number]]          PCP:  Joellen Ramires PA-C  [phone]       Admission Date to our Facility: June 13, 2017 from [facility].  Discharge Date from our Facility: July 1, 2017    Discharge Diagnosis:    1. Closed fracture of right hip with routine healing     2. Anemia, unspecified type     3. Essential hypertension with goal blood pressure less than 140/90     4. Type 2 diabetes mellitus with complication, unspecified long term insulin use status     5. Coronary artery disease involving coronary bypass graft of native heart without angina pectoris     6. Weakness          History of Present Illness: Dunia Forbes is a 88 y.o. female     Skilled Nursing Facility Course: The patient to his underlying coronary artery disease with recent stent was admitted after a fall and had a closed right femoral neck fracture.  They did a right sided hip hemiarthroplasty on 10 Lucia.  She was apparently shopping at a grocery store pushed a cart and then tripped and fell onto her right hip.  Uncomplicated surgery as well as hospital course.    Facility course; patient had a gradual course of improvement on her way to fairly good independence and no restrictions.  She still will go longer distances with her front wheel walker but can perform this with little difficulties.  Her pain has been managed with Tylenol only she is not needed the oxycodone.  She had no constipation issues.  Her blood pressure over the last 3-4 days has been trending up.  She still has been a little bit concerned about going home and how she will manage.  She did asked me for a bedside commode.  OT and PT have no concerns on her discharge.    Discharge Medications: Discontinue oxycodone, increase metoprolol tartrate from 50 mg twice daily to 75 mg twice daily which would be 1-1/2 of the 50 mg tablets.  PreserVision capsule multivitamin 1 twice daily,  ranitidine 300 mg nightly, refresh 1 drop both eyes 4 times daily as needed, senna docusate change from scheduled to 1 tablet twice daily as needed constipation, valsartan 160 mg twice daily, vitamin D 2000 units 1 daily, amlodipine 10 mg nightly, aspirin 325 mg 1 twice daily for prophylaxis completion date July 13, 2017.  Lipitor 20 mg daily, calcium magnesium 500/252 tabs twice daily, coenzyme Q 10 100 mg twice daily, ferrous sulfate 325 twice daily completion date July 13, glucosamine sulfate 1000 mg twice daily hold while on aspirin.  Latanoprost solution 0.005% 1 drop OU at at bedtime.  Metformin 1000 mg twice daily      For most current and accurate medication list, please contact the [facility] nursing facility that this patient visit took place at.      Discharge Plan: [facility]  Follow-up with primary care in 2-3 weeks and do blood pressure heart rate analysis so that she is getting q. O day checks notify primary care if systolic is less than 105 > 165 and if heart rate is greater than 100 or less than 60.  Patient may get home cuff to record these herself.  [facility]  She has an appointment with orthopedics July 10.    Review of Systems     Physical Exam   Constitutional:   Vital signs have been stable with the exception of the trending up systolic blood pressure   Musculoskeletal:   Lower extremity has no significant swelling patient is observed walking with fair gait speed with walker.   Vitals reviewed.      Labs:  All labs reviewed in the nursing home record.    Assessment:  1. Closed fracture of right hip with routine healing     2. Anemia, unspecified type     3. Essential hypertension with goal blood pressure less than 140/90     4. Type 2 diabetes mellitus with complication, unspecified long term insulin use status     5. Coronary artery disease involving coronary bypass graft of native  heart without angina pectoris     6. Weakness         MEDICAL EQUIPMENT NEEDS:        DISCHARGE PLAN/FACE TO FACE:  I certify that services are/were furnished while this patient was under the care of a physician and that a physician or an allowed non-physician practitioner (NPP), had a face-to-face encounter that meets the physician face-to-face encounter requirements. The encounter was in whole, or in part, related to the primary reason for home health. The patient is confined to his/her home and needs intermittent skilled nursing, physical therapy, speech-language pathology, or the continued need for occupational therapy. A plan of care has been established by a physician and is periodically reviewed by a physician.    I certify that this patient is under my care and that I, or a nurse practitioner or physician's assistant working with me, had a face-to-face encounter that meets the physician face-to-face encounter requirements with this patient.   Date of Face-to-Face Encounter: June 30, 2017    I certify that, based on my findings, the following services are medically necessary home health services: Home health aide and PT.  These will be instrumental in showing that she has continued progress particularly as she was somewhat recalcitrant at going home to give her greater support and ongoing therapy in the home.  Additionally blood pressure monitoring will be key as were making a blood pressure medicine change at this time.      .    45 minutes total time of which 65% was in face to face communication with patient about above plan of care.    Electronically signed by: Tommie Hager MD

## 2021-06-11 NOTE — ANESTHESIA CARE TRANSFER NOTE
Last vitals:   Vitals:    06/10/17 1100   BP: (!) 205/89   Pulse: 81   Resp: 18   Temp:    SpO2: 95%     Patient's level of consciousness is drowsy  Spontaneous respirations: yes  Maintains airway independently: yes  Dentition unchanged: yes  Oropharynx: oropharynx clear of all foreign objects    QCDR Measures:  ASA# 20 - Surgical Safety Checklist: ASA20A - Safety Checks Done  PQRS# 430 - Adult PONV Prevention: 4558F - Pt received => 2 anti-emetic agents (different classes) preop & intraop  ASA# 8 - Peds PONV Prevention: NA - Not pediatric patient, not GA or 2 or more risk factors NOT present  PQRS# 424 - Daisha-op Temp Management: 4559F - At least one body temp DOCUMENTED => 35.5C or 95.9F within required timeframe  PQRS# 426 - PACU Transfer Protocol: - Transfer of care checklist used  ASA# 14 - Acute Post-op Pain: ASA14B - Patient did NOT experience pain >= 7 out of 10

## 2021-06-11 NOTE — ANESTHESIA PREPROCEDURE EVALUATION
Anesthesia Evaluation      Patient summary reviewed   No history of anesthetic complications     Airway   Mallampati: II  Neck ROM: full   Pulmonary - negative ROS and normal exam    breath sounds clear to auscultation  (-) not a smoker                         Cardiovascular   Exercise tolerance: < 4 METS  (+) hypertension, past MI, CAD, CABG/stent, , hypercholesterolemia,     (-) murmur  ECG reviewed  Rhythm: regular  Rate: normal,    no murmur   ROS comment:  Normal left ventricular size and systolic function.   Left ventricular ejection fraction is visually estimated to be 65%.   Mild concentric left ventricular hypertrophy.   Normal right ventricular size and systolic function.   Severely enlarged left atrium.   Mild aortic regurgitation.   Mitral valve posterior leaflet is thickened with decreased leaflet   mobility.   Mild calcific mitral stenosis.   When this echo is compared with the echo from 12/1/2014, no significant   interval changes have occurred. Mitral mean gradient 5 mm Hg in 2014.     Procedure Summary   86 yo woman s/p CABG with ongoing severe arm pain on exertoin   olivia thas progressed. Angiography: LM 80% calcified lesion,   ostial LAD calcified 99%; mid LAD 70-80%; patent LIMA to mid   LAD; distal LAD 50% just distal to anastomosis; OM1 100% with   patent SVG; OM2 small with 70% sequential lesions; %   ostial PDA wit patent SVG to PDA.       Noted HTN during the case with SBP >200 improved with iv   metoprolol and hydralazine.       PCI: placement of cross it wire over turnpike catheter allowed   exchange for rotowire, followed by 1.25mm rotoblation;   followed by PTCA of LAD, LM and Circ; stent LM into Circ, post   dilated prox edge with 4.5mm balloon; placed 2.5x12mm promus   Nneka into prox LAD, kissing balloons of LAD/LM/Circ. Pt   developed arm pain wiht PCI, resolved at end of the case.     Neuro/Psych      Comments: Glaucoma  Macular degeneration  Carotid stenosis    Endo/Other    (+)  diabetes mellitus,      GI/Hepatic/Renal    (+) GERD intermittent,        Other findings: See echo-nl E.F., mod A.I.,      Dental - normal exam   (+) caps                       Anesthesia Plan  Planned anesthetic: general endotracheal    ASA 3   Induction: intravenous   Anesthetic plan and risks discussed with: patient  Anesthesia plan special considerations: antiemetics,   Post-op plan: routine recovery  DNR/DNI status   DNR/DNI status discussed with patient.  Plan is for no suspension of DNR

## 2021-06-16 PROBLEM — S01.81XA FACIAL LACERATION, INITIAL ENCOUNTER: Status: ACTIVE | Noted: 2018-02-08

## 2021-06-16 PROBLEM — I63.40: Status: ACTIVE | Noted: 2018-11-03

## 2021-06-16 PROBLEM — S72.001D CLOSED FRACTURE OF RIGHT HIP WITH ROUTINE HEALING: Status: ACTIVE | Noted: 2017-06-09

## 2021-06-16 PROBLEM — S80.211A KNEE ABRASION, RIGHT, INITIAL ENCOUNTER: Status: ACTIVE | Noted: 2018-02-08

## 2021-06-16 PROBLEM — R29.810 FACIAL DROOP: Status: ACTIVE | Noted: 2018-11-01

## 2021-06-16 PROBLEM — S49.91XA RIGHT SHOULDER INJURY, INITIAL ENCOUNTER: Status: ACTIVE | Noted: 2018-02-08

## 2021-06-16 PROBLEM — S00.81XA FACIAL ABRASION, INITIAL ENCOUNTER: Status: ACTIVE | Noted: 2018-02-08

## 2021-06-19 NOTE — PROGRESS NOTES
NEUROPSYCHOLOGY PROGRESS NOTE    NAME: Dunia Forbes  YOB: 1928     DATE OF EVALUATION: 8/6/2018      SUMMARY OF SESSION:  Dunia Forbes is a 89 y.o.,  female who was referred for a cognitive evaluation by PRANEETH Ribera.  Ms. Forbes arrived on time and accompanied by her nephew, Nadeem. We began the session by discussing her experience during the neuropsychometric evaluation.  I provided Ms. Forbes with detailed feedback regarding her performance on cognitive testing and her pattern of cognitive strengths and weaknesses.  I discussed my overall impressions and recommendations and provided the opportunity for Ms. Forbes to ask any questions that she had about the evaluation.  At the end of the session, she indicated that she understood the results and that I had answered all of her questions.  She was provided with my contact information, should any further questions or concerns arise in the future.    Please contact me with any questions regarding the content of this note.     Darline Carter PsyD, LP  [place]ensed [place] [address]  Phone: [phone]    For diagnostic and coding purposes, Dunia Forbes was referred for an evaluation of unspecified neurocognitive disorder.  This appointment consisted of a total of 1 hour of [id number].
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
## 2021-06-25 NOTE — PROGRESS NOTES
Progress Notes by Tommie Hager MD at 6/20/2017  4:56 PM     Author: Tommie Hager MD Service: -- Author Type: Physician    Filed: 6/20/2017  5:15 PM Encounter Date: 6/20/2017 Status: Signed    : Tommie Hager MD (Physician)       Code Status:  DNR  Visit Type: Problem Visit     Facility:  [facility] [[id number]]        Facility Type: SNF (Skilled Nursing Facility, TCU)    History of Present Illness: Dunia Forbes is a 88 y.o. female well-known to us who had had a hip fracture.  She has been making good progress and saw orthopedics today and they were pleased.  In addition we did laboratory today to check on her anemia.  She had some concerns about sugary drinks in that her blood sugar would be jumping upwards.  Indeed she is correct she did have a 234 and 201 which she had not had in previous times.  In addition to this she had an episode of diarrhea but this is passed.  She initially held her calcium and magnesium but at this juncture these have been resumed and she has not had any difficulties.    Review of Systems     Physical Exam   Constitutional:   As noted some increased blood sugars.  She is in good spirits alert orientated.  Lungs are clear heart is regular rate there is no significant pedal edema appreciated.   Vitals reviewed.      Labs:  All labs reviewed in the nursing home record.(Not Released) Next appt:  None Dx:  Essential (primary) hypertension         Ref Range & Units 6/20/17  8:35 AM   6/13/17  5:49 AM   6/12/17  5:41 AM   6/11/17  5:53 AM   6/10/17  5:48 AM      WBC 4.0 - 11.0 thou/uL 10.3 9.8 10.4 10.9 13.7 (H)    RBC 3.80 - 5.40 mill/uL 3.21 (L) 3.22 (L) 3.31 (L) 3.15 (L) 3.76 (L)    Hemoglobin 12.0 - 16.0 g/dL 9.8 (L) 9.6 (L) 10.0 (L) 9.5 (L) 11.2 (L)    Hematocrit 35.0 - 47.0 % 30.1 (L) 29.2 (L) 29.8 (L) 28.3 (L) 33.2 (L)    MCV 80 - 100 fL 94 91 90 90 88    MCH 27.0 - 34.0 pg 30.5 29.8 30.2 30.2 29.8    MCHC 32.0 - 36.0 g/dL 32.6 32.9 33.6 33.6 33.7     RDW 11.0 - 14.5 % 14.6 (H) 14.8 (H) 14.8 (H) 14.9 (H) 14.6 (H)    Platelets 140 - 440 thou/uL 470 (H) 182 170 173 204    MPV 8.5 - 12.5 fL 9.9 10.4 10.2 10.6 10.5   Resulting Agency  [facility]              13/17  5:49 AM   6/12/17  5:41 AM   6/11/17  5:53 AM   6/10/17  5:19 PM   6/10/17  5:48 AM         Sodium 136 - 145 mmol/L 138 140 134 (L)  135 (L)    Potassium 3.5 - 5.0 mmol/L 3.7 3.9 4.3 5.0 3.4 (L)    Chloride 98 - 107 mmol/L 105 108 (H) 105  103    CO2 22 - 31 mmol/L 25 23 23  23    Anion Gap, Calculation 5 - 18 mmol/L 8 9 6  9    Glucose 70 - 125 mg/dL 143 (H) 127 (H) 116  151 (H)    Calcium 8.5 - 10.5 mg/dL 9.1 9.2 8.5  9.3    BUN 8 - 28 mg/dL 12 12 13  11    Creatinine 0.60 - 1.10 mg/dL 0.82 0.77 0.88  0.79    GFR MDRD Af Amer >60 mL/min/1.73m2 >60 >60 >60  >60    GFR MDRD Non Af Amer >60 mL/min/1.73m2 >60 >60 >60  >60   Resulting Agency  [facility]          Assessment:  1. Closed fracture of right hip with routine healing     2. Type 2 diabetes mellitus with complication, unspecified long term insulin use status     3. Essential hypertension     4. S/P CABG x 3         Plan: The patient that her hemoglobin had improved and that her BUN and creatinine has remained stable.  We will ask that she not get with the exception of prune-juice any sugary drinks such as a juices.  Continue full speed with the therapy she is only using Tylenol for pain and I think she is making overall very good progress.  Changes at this time.      25 minutes spent of which greater than 65% was face to face communication with the patient about above plan of care    Electronically signed by: Tommie Hager MD

## 2021-06-25 NOTE — PROGRESS NOTES
Progress Notes by Tommie Hager MD at 2017 11:19 AM     Author: Tommie Hager MD Service: -- Author Type: Physician    Filed: 2017 11:35 AM Encounter Date: 2017 Status: Signed    : Tommie Hager MD (Physician)       Code Status:  DNR  Visit Type: Problem Visit (hip fx)     Facility:  [facility] [[id number]]        Facility Type: SNF (Skilled Nursing Facility, TCU)    History of Present Illness: Dunia Forbes is a 88 y.o. female who is seeing back for follow-up after she had a hip fracture in the right.  Initially therapy was going slow and she was somewhat resistive.  Is now significantly improved and she is classified independent with a walker.  She has been feeling that she is not quite up to Fritz and is worried about being done and discharge in 1 week's time.  Staff reports that she has reasonable gait.  Her pain has been fairly well modulated.  Initially this been no constipation and/or chest pain all blood pressures with the exception of the most recent have been normal.    Review of Systems     Physical Exam   Constitutional: She is oriented to person, place, and time.   Cardiovascular: Normal rate and regular rhythm.    Pulmonary/Chest: Effort normal and breath sounds normal.   Musculoskeletal:   Safe standing to rise and taking steps with a walker with fairly good accuracy and proficiency   Neurological: She is alert and oriented to person, place, and time.   Vitals reviewed.      Labs:  All labs reviewed in the nursing home record.Patient Demographics   Patient Name Sex  Address Phone   Dunia Forbes ([id number]) F [id number] [place] [address] [phone] (H)   Results   HM2(CBC w/o Differential) (Order [id number])      HM2(CBC w/o Differential)   Order: [id number]   Status:  Final result   Visible to patient:  No (Not Released) Next appt:  None Dx:  Essential (primary) hypertension         Ref Range & Units 17  8:35 AM    6/13/17  5:49 AM   6/12/17  5:41 AM   6/11/17  5:53 AM   6/10/17  5:48 AM      WBC 4.0 - 11.0 thou/uL 10.3 9.8 10.4 10.9 13.7 (H)    RBC 3.80 - 5.40 mill/uL 3.21 (L) 3.22 (L) 3.31 (L) 3.15 (L) 3.76 (L)    Hemoglobin 12.0 - 16.0 g/dL 9.8 (L) 9.6 (L) 10.0 (L) 9.5 (L) 11.2 (L)    Hematocrit 35.0 - 47.0 % 30.1 (L) 29.2 (L) 29.8 (L) 28.3 (L) 33.2 (L)    MCV 80 - 100 fL 94 91 90 90 88    MCH 27.0 - 34.0 pg 30.5 29.8 30.2 30.2 29.8    MCHC 32.0 - 36.0 g/dL 32.6 32.9 33.6 33.6 33.7    RDW 11.0 - 14.5 % 14.6 (H) 14.8 (H) 14.8 (H) 14.9 (H) 14.6 (H)    Platelets 140 - 440 thou/uL 470 (H) 182 170 173 204    MPV 8.5 - 12.5 fL 9.9 10.4 10.2 10.6 10.5   Resulting Agency  [facility]  Laboratory [facility]      Specimen Collected: 06/20/17  8:35 AM Last Resulted: 06/20/17 11:55 AM Lab Flowsheet Order Details View Encounter Lab and Collection Details Routing Result History               Other Results from 6/20/2017          Lab Information     Assessment:  1. Closed fracture of right hip with routine healing     2. Anemia, unspecified type     3. Coronary artery disease involving coronary bypass graft of native heart without angina pectoris     4. Essential hypertension with goal blood pressure less than 140/90         Plan: Went over with the patient that now her hemoglobin is up to where it was prior and beyond so this is good sign.  I also made significant comment on her improvement in gait and overall attitude.  I reassured her that if she is not plateaued we could consider extra days based on her performance.  At this juncture has been in good control.  Adjust medicines at this time      25 minutes spent of which greater than 65% was face to face communication with the patient about above plan of care    Electronically signed by: Tommie Hager MD

## 2021-06-25 NOTE — PROGRESS NOTES
Progress Notes by Tommie Hager MD at 6/27/2017  9:57 AM     Author: Tommie Hager MD Service: -- Author Type: Physician    Filed: 6/27/2017 11:42 AM Encounter Date: 6/27/2017 Status: Signed    : Tommie Hager MD (Physician)       Code Status:  DNR  Visit Type: Problem Visit (progress)     Facility:  [facility] [[id number]]        Facility Type: SNF (Skilled Nursing Facility, TCU)    History of Present Illness: Dunia Forbes is a 88 y.o. female seeing in follow-up after her hip fracture.  She has continued to be successful at being independent in mobility.  However she still feels that she is unsteady and is quite concerned about her planned discharge.  At this juncture for therapy is completed on Friday and [facility]  Staff reports that she has good gait speed good balance and is independent.  She is concerned that she is unsteady when she gets up at night and had requested a bedside commode.    Review of Systems     Physical Exam   Constitutional:   Patient appears to be in better spirits a little bit more upbeat.  She is seen walking independently with good efforts and good speed.  She was performing exercises with little difficulty.  Vital signs have been normal and there is no pedal edema noted.   Vitals reviewed.      Labs:  All labs reviewed in the nursing home record.Next appt:  None Dx:  Essential (primary) hypertension         Ref Range & Units 6/20/17  8:35 AM   6/13/17  5:49 AM   6/12/17  5:41 AM   6/11/17  5:53 AM   6/10/17  5:48 AM      WBC 4.0 - 11.0 thou/uL 10.3 9.8 10.4 10.9 13.7 (H)    RBC 3.80 - 5.40 mill/uL 3.21 (L) 3.22 (L) 3.31 (L) 3.15 (L) 3.76 (L)    Hemoglobin 12.0 - 16.0 g/dL 9.8 (L) 9.6 (L) 10.0 (L) 9.5 (L) 11.2 (L)    Hematocrit 35.0 - 47.0 % 30.1 (L) 29.2 (L) 29.8 (L) 28.3 (L) 33.2 (L)    MCV 80 - 100 fL 94 91 90 90 88    MCH 27.0 - 34.0 pg 30.5 29.8 30.2 30.2 29.8    MCHC 32.0 - 36.0 g/dL 32.6 32.9  33.6 33.6 33.7    RDW 11.0 - 14.5 % 14.6 (H) 14.8 (H) 14.8 (H) 14.9 (H) 14.6 (H)    Platelets 140 - 440 thou/uL 470 (H) 182 170 173 204    MPV 8.5 - 12.5 fL 9.9 10.4 10.2 10.6 10.5   Resulting Agency  [facility]      Specimen Collected: 06/20/17  8:35 AM Last Resulted: 06/20/17 11:55                  Assessment:  1. Closed fracture of right hip with routine healing     2. Type 2 diabetes mellitus with complication, unspecified long term insulin use status     3. Coronary artery disease involving coronary bypass graft of native heart without angina pectoris     4. Weakness         Plan: Informed her that her hemoglobin had come up from 9.6-9.8 so presumption is that we will get better with time.  Also I will write for a bedside commode for her when she is in the apartments as she was concerned that she may not make it to the bathroom on her own.  She is infrequently using her pain medicines and we will have final discussions on that on Friday about the pain. Blood  Sugars have been excellent and we will make no adjustments in that area as well.      25 minutes spent of which greater than 65% was face to face communication with the patient about above plan of care    Electronically signed by: Tommie Hager MD

## 2021-07-14 PROBLEM — G45.9 TIA (TRANSIENT ISCHEMIC ATTACK): Status: RESOLVED | Noted: 2018-11-01 | Resolved: 2018-11-03

## 2021-07-21 ENCOUNTER — RECORDS - HEALTHEAST (OUTPATIENT)
Dept: ADMINISTRATIVE | Facility: CLINIC | Age: 86
End: 2021-07-21

## 2022-08-24 ENCOUNTER — APPOINTMENT (OUTPATIENT)
Dept: CT IMAGING | Facility: OTHER | Age: 87
End: 2022-08-24
Attending: FAMILY MEDICINE
Payer: COMMERCIAL

## 2022-08-24 ENCOUNTER — HOSPITAL ENCOUNTER (EMERGENCY)
Facility: OTHER | Age: 87
Discharge: HOME | End: 2022-08-24
Attending: FAMILY MEDICINE | Admitting: FAMILY MEDICINE
Payer: COMMERCIAL

## 2022-08-24 VITALS
DIASTOLIC BLOOD PRESSURE: 67 MMHG | RESPIRATION RATE: 20 BRPM | TEMPERATURE: 97.8 F | WEIGHT: 182 LBS | HEART RATE: 67 BPM | HEIGHT: 68 IN | BODY MASS INDEX: 27.58 KG/M2 | SYSTOLIC BLOOD PRESSURE: 171 MMHG | OXYGEN SATURATION: 96 %

## 2022-08-24 DIAGNOSIS — S00.83XA CONTUSION OF FOREHEAD, INITIAL ENCOUNTER: ICD-10-CM
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
## 2022-08-24 LAB
ANION GAP SERPL CALCULATED.3IONS-SCNC: 10 MMOL/L (ref 3–14)
BASOPHILS # BLD AUTO: 0.1 10E3/UL (ref 0–0.2)
BASOPHILS NFR BLD AUTO: 1 %
BUN SERPL-MCNC: 39 MG/DL (ref 7–25)
CALCIUM SERPL-MCNC: 9.3 MG/DL (ref 8.6–10.3)
CHLORIDE BLD-SCNC: 105 MMOL/L (ref 98–107)
CO2 SERPL-SCNC: 26 MMOL/L (ref 21–31)
CREAT SERPL-MCNC: 1.56 MG/DL (ref 0.6–1.2)
EOSINOPHIL # BLD AUTO: 0.5 10E3/UL (ref 0–0.7)
EOSINOPHIL NFR BLD AUTO: 6 %
ERYTHROCYTE [DISTWIDTH] IN BLOOD BY AUTOMATED COUNT: 14.6 % (ref 10–15)
GFR SERPL CREATININE-BSD FRML MDRD: 30 ML/MIN/1.73M2
GLUCOSE BLD-MCNC: 117 MG/DL (ref 70–105)
HCT VFR BLD AUTO: 34.1 % (ref 35–47)
HGB BLD-MCNC: 10.8 G/DL (ref 11.7–15.7)
IMM GRANULOCYTES # BLD: 0.1 10E3/UL
IMM GRANULOCYTES NFR BLD: 1 %
INR PPP: 1 (ref 0.85–1.15)
LYMPHOCYTES # BLD AUTO: 1 10E3/UL (ref 0.8–5.3)
LYMPHOCYTES NFR BLD AUTO: 14 %
MCH RBC QN AUTO: 31.2 PG (ref 26.5–33)
MCHC RBC AUTO-ENTMCNC: 31.7 G/DL (ref 31.5–36.5)
MCV RBC AUTO: 99 FL (ref 78–100)
MONOCYTES # BLD AUTO: 0.7 10E3/UL (ref 0–1.3)
MONOCYTES NFR BLD AUTO: 9 %
NEUTROPHILS # BLD AUTO: 5.2 10E3/UL (ref 1.6–8.3)
NEUTROPHILS NFR BLD AUTO: 69 %
NRBC # BLD AUTO: 0 10E3/UL
NRBC BLD AUTO-RTO: 0 /100
PLATELET # BLD AUTO: 175 10E3/UL (ref 150–450)
POTASSIUM BLD-SCNC: 4.3 MMOL/L (ref 3.5–5.1)
RBC # BLD AUTO: 3.46 10E6/UL (ref 3.8–5.2)
SODIUM SERPL-SCNC: 141 MMOL/L (ref 134–144)
WBC # BLD AUTO: 7.5 10E3/UL (ref 4–11)

## 2022-08-24 PROCEDURE — 85025 COMPLETE CBC W/AUTO DIFF WBC: CPT | Performed by: FAMILY MEDICINE

## 2022-08-24 PROCEDURE — 70450 CT HEAD/BRAIN W/O DYE: CPT

## 2022-08-24 PROCEDURE — 36415 COLL VENOUS BLD VENIPUNCTURE: CPT | Performed by: FAMILY MEDICINE

## 2022-08-24 PROCEDURE — 72125 CT NECK SPINE W/O DYE: CPT | Mod: MA

## 2022-08-24 PROCEDURE — 99285 EMERGENCY DEPT VISIT HI MDM: CPT | Mod: 25 | Performed by: FAMILY MEDICINE

## 2022-08-24 PROCEDURE — 82310 ASSAY OF CALCIUM: CPT | Performed by: FAMILY MEDICINE

## 2022-08-24 PROCEDURE — 683N000002 HC PARTIAL TRAUMA W/O CC LEVEL III: Performed by: FAMILY MEDICINE

## 2022-08-24 PROCEDURE — 99283 EMERGENCY DEPT VISIT LOW MDM: CPT | Performed by: FAMILY MEDICINE

## 2022-08-24 PROCEDURE — 85610 PROTHROMBIN TIME: CPT | Performed by: FAMILY MEDICINE

## 2022-08-24 ASSESSMENT — ACTIVITIES OF DAILY LIVING (ADL)
ADLS_ACUITY_SCORE: 35
ADLS_ACUITY_SCORE: 35

## 2022-08-24 NOTE — ED PROVIDER NOTES
"  History     Chief Complaint   Patient presents with     Trauma     Fall     HPI  Dunia Forbes is a 89 year old female with history of dementia from [facility] who arrives via EMS after a fall, unclear circumstances.  She was left with a large bruise and swelling in her left forehead.  Staff does not believe she lost consciousness, she is on Plavix.  Patient does not endorse other injuries.    Reviewed nurses notes below, similar history is related to me.  Patient from [facility] and was transferring back from the toilet into the wheelchair. She lost her footing and fell and hit her forehead on the wheelchair. She did not loose consciousness. She is on plavix. No other injuries  Allergies:  Allergies   Allergen Reactions     Amitriptyline Itching     Atorvastatin Itching     Chondroitin Analogues [Chondroitin Sulfate A] Unknown     Knee pain     Lisinopril Cough     Pneumococcal Vaccine Unknown     \"Pneumonia shot\" allergy per outside documentation.     Pravachol [Pravastatin] Itching     Sulfa (Sulfonamide Antibiotics) [Sulfa Drugs] Unknown     Allergy to \"sulfa\" is per outside documentation.       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     Priority: Medium     Facial droop 11/01/2018     Priority: Medium     Facial abrasion, initial encounter 02/08/2018     Priority: Medium     Facial laceration, initial encounter 02/08/2018     Priority: Medium     Knee abrasion, right, initial encounter 02/08/2018     Priority: Medium     Right shoulder injury, initial encounter 02/08/2018     Priority: Medium     Other elevated white blood cell (WBC) count      Priority: Medium     Closed fracture of right hip with routine healing 06/09/2017     Priority: Medium     Elevated blood pressure      Priority: Medium     Glaucoma 05/27/2016     Priority: Medium     Macular degeneration 05/27/2016     Priority: Medium     Constipation " 05/24/2016     Priority: Medium     Lt ICA 70% Stenosis 05/17/2016     Priority: Medium     Weakness 05/10/2016     Priority: Medium     Malignant hypertension 05/10/2016     Priority: Medium     Recurrent falls while walking 05/10/2016     Priority: Medium     Exertional chest pain 11/09/2015     Priority: Medium     Anemia 12/04/2014     Priority: Medium     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     Priority: Medium     CAD (coronary artery disease) 11/30/2014     Priority: Medium     DM (diabetes mellitus) (H) 11/30/2014     Priority: Medium     Primary hypertension 11/30/2014     Priority: Medium     Dyslipidemia 11/30/2014     Priority: Medium     NSTEMI (non-ST elevated myocardial infarction) (H) 11/30/2014     Priority: Medium     Abnormal nuclear stress test 11/30/2014     Priority: Medium        Past Medical History:    Past Medical History:   Diagnosis Date     Arthritis      Diabetes mellitus (H)      GERD (gastroesophageal reflux disease)      Glaucoma      Hypercholesteremia      Hypertension      Macular degeneration        Past Surgical History:    Past Surgical History:   Procedure Laterality Date     BYPASS GRAFT ARTERY CORONARY N/A 12/3/2014    Procedure: CORONARY ARTERY BYPASS X 3 WITH INTERNAL MAMMARY ARTERY, ENDOSCOPIC SAPHENOUS VEIN HARVEST ANESTHESIA TRANSESOPHAGEAL ECHOCARDIOGRAM ( Rm [id number] );  Surgeon: Nathalia Douglas MD;  Location: [facility];  Service:      CATARACT EXTRACTION       CATARACT IOL, RT/LT       ESOPHAGOSCOPY, GASTROSCOPY, DUODENOSCOPY (EGD), COMBINED  11/29/2012    Procedure: COMBINED ESOPHAGOSCOPY, GASTROSCOPY, DUODENOSCOPY (EGD), BIOPSY SINGLE OR MULTIPLE;  COMBINED ESOPHAGOSCOPY, GASTROSCOPY, DUODENOSCOPY (EGD) ;  Surgeon: Nick Chawla MD;  Location: [facility] PARTIAL HIP REPLACEMENT Right 6/10/2017    Procedure: RIGHT HIP HEMIARTHROPLASTY;  Surgeon: Fabian Cortez MD;  Location: [facility];  Service: Orthopedics       Family History:    No family  "history on file.    Social History:  Marital Status:  Single [1]  Social History     Tobacco Use     Smoking status: Never Smoker   Substance Use Topics     Alcohol use: No     Drug use: No        Medications:    ASPIRIN PO  Calcium Carbonate (CALTRATE 600 PO)  Ezetimibe (ZETIA PO)  Ferrous Sulfate (IRON SUPPLEMENT PO)  GEMFIBROZIL PO  GLUCOSAMINE SULFATE PO  latanoprost (XALATAN) 0.005 % ophthalmic solution  METFORMIN HCL PO  Multiple Vitamins-Minerals (ICAPS) CAPS  Omeprazole (PRILOSEC PO)  Soft Lens Products (OPTI-FREE EXPRESS)  Valsartan (DIOVAN PO)  VITAMIN A PO          Review of Systems   Unable to perform ROS: Dementia       Physical Exam   BP: (!) 156/66  Pulse: 66  Temp: 97.8  F (36.6  C)  Resp: 14  Height: 172.7 cm (5' 8\")  Weight: 82.6 kg (182 lb)  SpO2: 99 %      Physical Exam  Vitals and nursing note reviewed.   HENT:      Head: Contusion present. No Swanson's sign.   Cardiovascular:      Rate and Rhythm: Normal rate.      Pulses: Normal pulses.   Chest:      Chest wall: No tenderness.   Abdominal:      Tenderness: There is no abdominal tenderness.   Musculoskeletal:         General: No tenderness.      Cervical back: Normal range of motion. No tenderness.         ED Course        Results for orders placed or performed during the hospital encounter of 08/24/22 (from the past 24 hour(s))   CBC with platelets differential    Narrative    The following orders were created for panel order CBC with platelets differential.  Procedure                               Abnormality         Status                     ---------                               -----------         ------                     CBC with platelets and d...[[id number]]  Abnormal            Final result                 Please view results for these tests on the individual orders.   INR   Result Value Ref Range    INR 1.00 0.85 - 1.15   Basic metabolic panel   Result Value Ref Range    Sodium 141 134 - 144 mmol/L    Potassium 4.3 3.5 - 5.1 mmol/L "    Chloride 105 98 - 107 mmol/L    Carbon Dioxide (CO2) 26 21 - 31 mmol/L    Anion Gap 10 3 - 14 mmol/L    Urea Nitrogen 39 (H) 7 - 25 mg/dL    Creatinine 1.56 (H) 0.60 - 1.20 mg/dL    Calcium 9.3 8.6 - 10.3 mg/dL    Glucose 117 (H) 70 - 105 mg/dL    GFR Estimate 30 (L) >60 mL/min/1.73m2   CBC with platelets and differential   Result Value Ref Range    WBC Count 7.5 4.0 - 11.0 10e3/uL    RBC Count 3.46 (L) 3.80 - 5.20 10e6/uL    Hemoglobin 10.8 (L) 11.7 - 15.7 g/dL    Hematocrit 34.1 (L) 35.0 - 47.0 %    MCV 99 78 - 100 fL    MCH 31.2 26.5 - 33.0 pg    MCHC 31.7 31.5 - 36.5 g/dL    RDW 14.6 10.0 - 15.0 %    Platelet Count 175 150 - 450 10e3/uL    % Neutrophils 69 %    % Lymphocytes 14 %    % Monocytes 9 %    % Eosinophils 6 %    % Basophils 1 %    % Immature Granulocytes 1 %    NRBCs per 100 WBC 0 <1 /100    Absolute Neutrophils 5.2 1.6 - 8.3 10e3/uL    Absolute Lymphocytes 1.0 0.8 - 5.3 10e3/uL    Absolute Monocytes 0.7 0.0 - 1.3 10e3/uL    Absolute Eosinophils 0.5 0.0 - 0.7 10e3/uL    Absolute Basophils 0.1 0.0 - 0.2 10e3/uL    Absolute Immature Granulocytes 0.1 <=0.4 10e3/uL    Absolute NRBCs 0.0 10e3/uL   CT Head w/o Contrast    Narrative    EXAM: CT HEAD W/O CONTRAST 8/24/2022 8:30 AM    PROVIDED HISTORY: fall >90    COMPARISON: CTA head and neck 11/1/2018; CT head 6/9/2017    TECHNIQUE:   Imaging protocol: Multiplanar CT images of the head without  intravenous contrast.   Acquisition: This CT exam was performed using one or more the  following dose reduction techniques: automated exposure control,  adjustment of the mA and/or kV according to patient size, and/or  iterative reconstruction technique.    FINDINGS:  No intracranial hemorrhage, mass effect, or midline shift. The  ventricles are proportionate to the cerebral sulci. Multifocal patchy  to confluent foci of hypodensity in the periventricular and  supraventricular white matter, which is nonspecific, likely related to  chronic small vessel ischemic  disease given the patient's age.  Moderate general parenchymal volume loss. No acute loss of gray-white  matter differentiation. Atherosclerotic calcification carotid siphons  and intracranial vertebral arteries.     No acute osseous abnormality. Left frontal scalp subcutaneous  hematoma. No paranasal sinus mucosal thickening. Mastoid air cells are  clear. Bilateral pseudophakia.       Impression    IMPRESSION:  1. No acute intracranial abnormality.  2. Moderate presumed small vessel ischemic disease changes and  moderate volume loss.    ARSLAN JUÁREZ MD         SYSTEM ID:  [id number]   CT Cervical Spine w/o Contrast    Narrative    CT CERVICAL SPINE W/O CONTRAST 8/24/2022 8:31 AM    Provided History: Fall >90    Comparison: CT cervical 2/8/2018, 6/9/2017    Technique: Using multidetector thin collimation helical acquisition  technique, axial, coronal and sagittal CT images through the cervical  spine were obtained without intravenous contrast.     Findings:  Grade 1 anterolisthesis of C4 on C5. Straightening of the normal  cervical lordotic curvature. No acute fracture or subluxation. No  prevertebral edema. There is moderate disc space narrowing at C3-4 and  C5-T1. The findings on a level by level basis are as follows:    C2-3: Facet and uncinate hypertrophy. Mild right foraminal narrowing.  No spinal canal narrowing.    C3-4:  Moderate facet hypertrophy. Disc osteophyte complex. Moderate  bilateral foraminal narrowing. Mild spinal canal narrowing.    C4-5:  Grade 1 anterolisthesis. Advanced facet hypertrophy. Severe  left foraminal narrowing. Moderate right foraminal narrowing. Mild  spinal canal narrowing.    C5-6:  Disc osteophyte complex. Mild facet hypertrophy. Severe right  foraminal narrowing. Moderate left foraminal narrowing. Mild spinal  canal narrowing.    C6-7:  Disc osteophyte complex. Moderate bilateral foraminal  narrowing. Mild spinal canal narrowing.    C7-T1:  Disc osteophyte complex. Moderate left  femoral narrowing. Mild  right foraminal narrowing. Mild spinal canal narrowing     No acute abnormality of the paraspinous soft tissues. Mild-to-moderate  atherosclerotic calcifications of the carotid bulbs.      Impression    Impression:   1.  No acute fracture or subluxation.  2.  Mild progression of multilevel cervical spondylosis, greatest at  C4-5 there is severe left and moderate right foraminal narrowing.  3.  No high-grade spinal canal narrowing at any level.    ARSLAN JUÁREZ MD         SYSTEM ID:  [id number]       Medications - No data to display    Assessments & Plan (with Medical Decision Making)     I have reviewed the nursing notes.    I have reviewed the findings, diagnosis, plan and need for follow up with the patient.  Reassuring clinical exam, hematoma left forehead not expanding during her ED stay.  No interval development of mastoid tenderness or ecchymoses.  TMs clear bilaterally.  No evidence of other injury, low clinical suspicion for occult injury.  Tylenol as needed at home, nurse to nurse report provided back to nursing facility.  Return with worsening symptoms such as interval lethargy, vomiting or worsening headache.    New Prescriptions    No medications on file       Final diagnoses:   Fall, initial encounter   Contusion of forehead, initial encounter       8/24/2022   [facility]  08/24/22 [id number]

## 2022-08-24 NOTE — PROGRESS NOTES
:    Patient presented to the ED from a fall after she lost her footing at her assisted living.     Patient is  from [facility].    LEONCIO Santacruz on 8/24/2022 at 8:41 AM      :    Patient is going to be transported back via [facility] transport at 1115 per nursing note.    Left message on [facility] nurses desk and also on on- call phone [phone] regarding patients return.    LEONCIO Santacruz on 8/24/2022 at 10:07 AM

## 2022-08-24 NOTE — ED TRIAGE NOTES
Patient from [facility] and was transferring back from the toilet into the wheelchair. She lost her footing and fell and hit her forehead on the wheelchair. She did not loose consciousness. She is on plavix. No other injuries.

## 2022-08-25 ENCOUNTER — DOCUMENTATION ONLY (OUTPATIENT)
Dept: OTHER | Facility: CLINIC | Age: 87
End: 2022-08-25

## 2022-10-06 ENCOUNTER — HOSPITAL ENCOUNTER (EMERGENCY)
Facility: OTHER | Age: 87
Discharge: HOME | End: 2022-10-07
Attending: EMERGENCY MEDICINE | Admitting: EMERGENCY MEDICINE
Payer: COMMERCIAL

## 2022-10-06 DIAGNOSIS — N39.0 URINARY TRACT INFECTION WITHOUT HEMATURIA, SITE UNSPECIFIED: ICD-10-CM

## 2022-10-06 LAB
ALBUMIN UR-MCNC: 30 MG/DL
ANION GAP SERPL CALCULATED.3IONS-SCNC: 9 MMOL/L (ref 3–14)
APPEARANCE UR: ABNORMAL
BACTERIA #/AREA URNS HPF: ABNORMAL /HPF
BASOPHILS # BLD AUTO: 0.1 10E3/UL (ref 0–0.2)
BASOPHILS NFR BLD AUTO: 1 %
BILIRUB UR QL STRIP: NEGATIVE
BUN SERPL-MCNC: 24 MG/DL (ref 7–25)
CALCIUM SERPL-MCNC: 9.4 MG/DL (ref 8.6–10.3)
CHLORIDE BLD-SCNC: 103 MMOL/L (ref 98–107)
CO2 SERPL-SCNC: 26 MMOL/L (ref 21–31)
COLOR UR AUTO: YELLOW
CREAT SERPL-MCNC: 1.33 MG/DL (ref 0.6–1.2)
EOSINOPHIL # BLD AUTO: 0.4 10E3/UL (ref 0–0.7)
EOSINOPHIL NFR BLD AUTO: 4 %
ERYTHROCYTE [DISTWIDTH] IN BLOOD BY AUTOMATED COUNT: 13.9 % (ref 10–15)
GFR SERPL CREATININE-BSD FRML MDRD: 37 ML/MIN/1.73M2
GLUCOSE BLD-MCNC: 160 MG/DL (ref 70–105)
GLUCOSE UR STRIP-MCNC: NEGATIVE MG/DL
HCT VFR BLD AUTO: 31.4 % (ref 35–47)
HGB BLD-MCNC: 10.5 G/DL (ref 11.7–15.7)
HGB UR QL STRIP: NEGATIVE
HOLD SPECIMEN: NORMAL
HYALINE CASTS: 16 /LPF
IMM GRANULOCYTES # BLD: 0.2 10E3/UL
IMM GRANULOCYTES NFR BLD: 2 %
KETONES UR STRIP-MCNC: NEGATIVE MG/DL
LEUKOCYTE ESTERASE UR QL STRIP: ABNORMAL
LYMPHOCYTES # BLD AUTO: 1 10E3/UL (ref 0.8–5.3)
LYMPHOCYTES NFR BLD AUTO: 10 %
MCH RBC QN AUTO: 30.9 PG (ref 26.5–33)
MCHC RBC AUTO-ENTMCNC: 33.4 G/DL (ref 31.5–36.5)
MCV RBC AUTO: 92 FL (ref 78–100)
MONOCYTES # BLD AUTO: 0.8 10E3/UL (ref 0–1.3)
MONOCYTES NFR BLD AUTO: 8 %
MUCOUS THREADS #/AREA URNS LPF: PRESENT /LPF
NEUTROPHILS # BLD AUTO: 7.5 10E3/UL (ref 1.6–8.3)
NEUTROPHILS NFR BLD AUTO: 75 %
NITRATE UR QL: NEGATIVE
NRBC # BLD AUTO: 0 10E3/UL
NRBC BLD AUTO-RTO: 0 /100
PH UR STRIP: 5 [PH] (ref 5–9)
PLATELET # BLD AUTO: 241 10E3/UL (ref 150–450)
POTASSIUM BLD-SCNC: 3.6 MMOL/L (ref 3.5–5.1)
RBC # BLD AUTO: 3.4 10E6/UL (ref 3.8–5.2)
RBC URINE: 6 /HPF
SODIUM SERPL-SCNC: 138 MMOL/L (ref 134–144)
SP GR UR STRIP: 1.02 (ref 1–1.03)
UROBILINOGEN UR STRIP-MCNC: NORMAL MG/DL
WBC # BLD AUTO: 10 10E3/UL (ref 4–11)
WBC CLUMPS #/AREA URNS HPF: PRESENT /HPF
WBC URINE: 27 /HPF

## 2022-10-06 PROCEDURE — 81001 URINALYSIS AUTO W/SCOPE: CPT | Performed by: EMERGENCY MEDICINE

## 2022-10-06 PROCEDURE — 80048 BASIC METABOLIC PNL TOTAL CA: CPT | Performed by: EMERGENCY MEDICINE

## 2022-10-06 PROCEDURE — 87086 URINE CULTURE/COLONY COUNT: CPT | Performed by: EMERGENCY MEDICINE

## 2022-10-06 PROCEDURE — 99283 EMERGENCY DEPT VISIT LOW MDM: CPT | Performed by: EMERGENCY MEDICINE

## 2022-10-06 PROCEDURE — 85014 HEMATOCRIT: CPT | Performed by: EMERGENCY MEDICINE

## 2022-10-06 PROCEDURE — 250N000013 HC RX MED GY IP 250 OP 250 PS 637: Performed by: EMERGENCY MEDICINE

## 2022-10-06 PROCEDURE — 36415 COLL VENOUS BLD VENIPUNCTURE: CPT | Performed by: EMERGENCY MEDICINE

## 2022-10-06 RX ORDER — CEFUROXIME AXETIL 250 MG/1
250 TABLET ORAL ONCE
Status: COMPLETED | OUTPATIENT
Start: 2022-10-06 | End: 2022-10-06

## 2022-10-06 RX ORDER — CEFUROXIME AXETIL 250 MG/1
250 TABLET ORAL 2 TIMES DAILY
Qty: 14 TABLET | Refills: 0 | Status: SHIPPED | OUTPATIENT
Start: 2022-10-06 | End: 2023-02-11

## 2022-10-06 RX ORDER — CEFUROXIME AXETIL 250 MG/1
250 TABLET ORAL 2 TIMES DAILY
Qty: 14 TABLET | Refills: 0 | Status: SHIPPED | OUTPATIENT
Start: 2022-10-06 | End: 2022-10-06

## 2022-10-06 RX ADMIN — CEFUROXIME AXETIL 250 MG: 250 TABLET, FILM COATED ORAL at 21:06

## 2022-10-06 ASSESSMENT — ACTIVITIES OF DAILY LIVING (ADL)
ADLS_ACUITY_SCORE: 35
ADLS_ACUITY_SCORE: 35

## 2022-10-07 VITALS
RESPIRATION RATE: 16 BRPM | HEART RATE: 87 BPM | TEMPERATURE: 98.9 F | OXYGEN SATURATION: 97 % | SYSTOLIC BLOOD PRESSURE: 145 MMHG | DIASTOLIC BLOOD PRESSURE: 67 MMHG

## 2022-10-07 ASSESSMENT — ACTIVITIES OF DAILY LIVING (ADL): ADLS_ACUITY_SCORE: 35

## 2022-10-07 NOTE — ED NOTES
EMS arrives to transport pt.  Dry brief placed, pt clothes, alvarado pack, call button remain with pt for transport.

## 2022-10-07 NOTE — ED PROVIDER NOTES
"  History     Chief Complaint   Patient presents with     Hallucinations     Altered Mental Status     HPI  Dunia Forbes is a 89 year old female who comes in from local skilled nursing facility for altered mental status.  She seemed very agitated and they could not get her relax.  She would not take her medications so they sent her in here.  Patient herself has some dementia.  She says she feels fine and denies any pain.  She just wants to go back to her room.    Allergies:  Allergies   Allergen Reactions     Amitriptyline Itching     Atorvastatin Itching     Chondroitin Analogues [Chondroitin Sulfate A] Unknown     Knee pain     Lisinopril Cough     Pneumococcal Vaccine Unknown     \"Pneumonia shot\" allergy per outside documentation.     Pravachol [Pravastatin] Itching     Sulfa (Sulfonamide Antibiotics) [Sulfa Drugs] Unknown     Allergy to \"sulfa\" is per outside documentation.       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     Priority: Medium     Facial droop 11/01/2018     Priority: Medium     Facial abrasion, initial encounter 02/08/2018     Priority: Medium     Facial laceration, initial encounter 02/08/2018     Priority: Medium     Knee abrasion, right, initial encounter 02/08/2018     Priority: Medium     Right shoulder injury, initial encounter 02/08/2018     Priority: Medium     Other elevated white blood cell (WBC) count      Priority: Medium     Closed fracture of right hip with routine healing 06/09/2017     Priority: Medium     Elevated blood pressure      Priority: Medium     Glaucoma 05/27/2016     Priority: Medium     Macular degeneration 05/27/2016     Priority: Medium     Constipation 05/24/2016     Priority: Medium     Lt ICA 70% Stenosis 05/17/2016     Priority: Medium     Weakness 05/10/2016     Priority: Medium     Malignant hypertension 05/10/2016     Priority: Medium     Recurrent falls while walking 05/10/2016     " Priority: Medium     Exertional chest pain 11/09/2015     Priority: Medium     Anemia 12/04/2014     Priority: Medium     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     Priority: Medium     CAD (coronary artery disease) 11/30/2014     Priority: Medium     DM (diabetes mellitus) (H) 11/30/2014     Priority: Medium     Primary hypertension 11/30/2014     Priority: Medium     Dyslipidemia 11/30/2014     Priority: Medium     NSTEMI (non-ST elevated myocardial infarction) (H) 11/30/2014     Priority: Medium     Abnormal nuclear stress test 11/30/2014     Priority: Medium        Past Medical History:    Past Medical History:   Diagnosis Date     Arthritis      Diabetes mellitus (H)      GERD (gastroesophageal reflux disease)      Glaucoma      Hypercholesteremia      Hypertension      Macular degeneration        Past Surgical History:    Past Surgical History:   Procedure Laterality Date     BYPASS GRAFT ARTERY CORONARY N/A 12/3/2014    Procedure: CORONARY ARTERY BYPASS X 3 WITH INTERNAL MAMMARY ARTERY, ENDOSCOPIC SAPHENOUS VEIN HARVEST ANESTHESIA TRANSESOPHAGEAL ECHOCARDIOGRAM ( Rm 4036 );  Surgeon: Nathalia Douglas MD;  Location: [facility];  Service:      CATARACT EXTRACTION       CATARACT IOL, RT/LT       ESOPHAGOSCOPY, GASTROSCOPY, DUODENOSCOPY (EGD), COMBINED  11/29/2012    Procedure: COMBINED ESOPHAGOSCOPY, GASTROSCOPY, DUODENOSCOPY (EGD), BIOPSY SINGLE OR MULTIPLE;  COMBINED ESOPHAGOSCOPY, GASTROSCOPY, DUODENOSCOPY (EGD) ;  Surgeon: Nick Chawla MD;  Location: [facility] PARTIAL HIP REPLACEMENT Right 6/10/2017    Procedure: RIGHT HIP HEMIARTHROPLASTY;  Surgeon: Fabian Cortez MD;  Location: [facility];  Service: Orthopedics       Family History:    History reviewed. No pertinent family history.    Social History:  Marital Status:  Single [1]  Social History     Tobacco Use     Smoking status: Never Smoker     Smokeless tobacco: Never Used   Substance Use Topics     Alcohol use: No     Drug use: No         Medications:    cefuroxime (CEFTIN) 250 MG tablet  ASPIRIN PO  Calcium Carbonate (CALTRATE 600 PO)  Ezetimibe (ZETIA PO)  Ferrous Sulfate (IRON SUPPLEMENT PO)  GEMFIBROZIL PO  GLUCOSAMINE SULFATE PO  latanoprost (XALATAN) 0.005 % ophthalmic solution  METFORMIN HCL PO  Multiple Vitamins-Minerals (ICAPS) CAPS  Omeprazole (PRILOSEC PO)  Soft Lens Products (OPTI-FREE EXPRESS)  Valsartan (DIOVAN PO)  VITAMIN A PO          Review of Systems   Unable to perform ROS: Mental status change       Physical Exam   BP: (!) 174/78  Pulse: 92  Temp: 100  F (37.8  C)  Resp: 16  SpO2: 96 %      Physical Exam  Vitals and nursing note reviewed.   Constitutional:       Appearance: She is well-developed.   HENT:      Mouth/Throat:      Mouth: Mucous membranes are moist.   Eyes:      Conjunctiva/sclera: Conjunctivae normal.   Cardiovascular:      Rate and Rhythm: Normal rate and regular rhythm.      Heart sounds: Normal heart sounds.   Pulmonary:      Effort: Pulmonary effort is normal.      Breath sounds: Normal breath sounds.   Abdominal:      General: Bowel sounds are normal. There is no distension.      Tenderness: There is no abdominal tenderness.   Skin:     General: Skin is warm and dry.   Neurological:      Mental Status: She is alert and oriented to person, place, and time.   Psychiatric:         Behavior: Behavior normal.         ED Course                 Procedures                Results for orders placed or performed during the hospital encounter of 10/06/22 (from the past 24 hour(s))   UA with Microscopic reflex to Culture    Specimen: Urine, Catheter   Result Value Ref Range    Color Urine Yellow Colorless, Straw, Light Yellow, Yellow    Appearance Urine Slightly Cloudy (A) Clear    Glucose Urine Negative Negative mg/dL    Bilirubin Urine Negative Negative    Ketones Urine Negative Negative mg/dL    Specific Gravity Urine 1.020 1.000 - 1.030    Blood Urine Negative Negative    pH Urine 5.0 5.0 - 9.0    Protein Albumin  Urine 30  (A) Negative mg/dL    Urobilinogen Urine Normal Normal, 2.0 mg/dL    Nitrite Urine Negative Negative    Leukocyte Esterase Urine Large (A) Negative    Bacteria Urine Many (A) None Seen /HPF    WBC Clumps Urine Present (A) None Seen /HPF    Mucus Urine Present (A) None Seen /LPF    RBC Urine 6 (H) <=2 /HPF    WBC Urine 27 (H) <=5 /HPF    Hyaline Casts Urine 16 (H) <=2 /LPF    Narrative    Urine Culture ordered based on laboratory criteria   CBC with platelets differential    Narrative    The following orders were created for panel order CBC with platelets differential.  Procedure                               Abnormality         Status                     ---------                               -----------         ------                     CBC with platelets and d...[[id number]]  Abnormal            Final result                 Please view results for these tests on the individual orders.   Basic metabolic panel   Result Value Ref Range    Sodium 138 134 - 144 mmol/L    Potassium 3.6 3.5 - 5.1 mmol/L    Chloride 103 98 - 107 mmol/L    Carbon Dioxide (CO2) 26 21 - 31 mmol/L    Anion Gap 9 3 - 14 mmol/L    Urea Nitrogen 24 7 - 25 mg/dL    Creatinine 1.33 (H) 0.60 - 1.20 mg/dL    Calcium 9.4 8.6 - 10.3 mg/dL    Glucose 160 (H) 70 - 105 mg/dL    GFR Estimate 37 (L) >60 mL/min/1.73m2   CBC with platelets and differential   Result Value Ref Range    WBC Count 10.0 4.0 - 11.0 10e3/uL    RBC Count 3.40 (L) 3.80 - 5.20 10e6/uL    Hemoglobin 10.5 (L) 11.7 - 15.7 g/dL    Hematocrit 31.4 (L) 35.0 - 47.0 %    MCV 92 78 - 100 fL    MCH 30.9 26.5 - 33.0 pg    MCHC 33.4 31.5 - 36.5 g/dL    RDW 13.9 10.0 - 15.0 %    Platelet Count 241 150 - 450 10e3/uL    % Neutrophils 75 %    % Lymphocytes 10 %    % Monocytes 8 %    % Eosinophils 4 %    % Basophils 1 %    % Immature Granulocytes 2 %    NRBCs per 100 WBC 0 <1 /100    Absolute Neutrophils 7.5 1.6 - 8.3 10e3/uL    Absolute Lymphocytes 1.0 0.8 - 5.3 10e3/uL    Absolute  Monocytes 0.8 0.0 - 1.3 10e3/uL    Absolute Eosinophils 0.4 0.0 - 0.7 10e3/uL    Absolute Basophils 0.1 0.0 - 0.2 10e3/uL    Absolute Immature Granulocytes 0.2 <=0.4 10e3/uL    Absolute NRBCs 0.0 10e3/uL   Extra Tube    Narrative    The following orders were created for panel order Extra Tube.  Procedure                               Abnormality         Status                     ---------                               -----------         ------                     Extra Red Top Tube[[id number]]                               Final result               Extra Blood Bank Purple ...[[id number]]                                                 Extra Green Top (Lithium...[[id number]]                      In process                   Please view results for these tests on the individual orders.   Extra Red Top Tube   Result Value Ref Range    Hold Specimen     Extra Tube    Narrative    The following orders were created for panel order Extra Tube.  Procedure                               Abnormality         Status                     ---------                               -----------         ------                     Extra Blue Top Tube[[id number]]                              Final result                 Please view results for these tests on the individual orders.   Extra Blue Top Tube   Result Value Ref Range    Hold Specimen         Medications   cefuroxime (CEFTIN) tablet 250 mg (250 mg Oral Given 10/6/22 2106)       Assessments & Plan (with Medical Decision Making)     I have reviewed the nursing notes.    I have reviewed the findings, diagnosis, plan and need for follow up with the patient.  Does appear to have UTI.  We will start her on some Ceftin.  She took the medication here without any difficulty.  We will discharge her back to her skilled nursing facility.    New Prescriptions    CEFUROXIME (CEFTIN) 250 MG TABLET    Take 1 tablet (250 mg) by mouth 2 times daily for 7 days       Final diagnoses:   Urinary tract  infection without hematuria, site unspecified       10/6/2022   [facility]     Brayden Cat MD  10/06/22 7476

## 2022-10-07 NOTE — ED TRIAGE NOTES
Pt arrives via EMS from [facility] with c/o from staff being that pt has been noncompliant for two days, not taking medications, and just yelling out. Per EMS report, pt does not have PRN medication to help calm pt. Pt has a history of dementia and has been seen multiple times for this complaint.     Triage Assessment     Row Name 10/06/22 1927       Triage Assessment (Adult)    Airway WDL WDL       Respiratory WDL    Respiratory WDL WDL       Skin Circulation/Temperature WDL    Skin Circulation/Temperature WDL WDL       Cardiac WDL    Cardiac WDL WDL       Peripheral/Neurovascular WDL    Peripheral Neurovascular WDL WDL       Cognitive/Neuro/Behavioral WDL    Cognitive/Neuro/Behavioral WDL X  dementia history, confused

## 2022-10-07 NOTE — ED NOTES
Report called to on call nurse and staff at [facility].  They state they have no transportation for pt, her [place] is out of the [place] and pt has no monetary funds available.  Nurse reports pt is wheelchair bound.

## 2022-10-07 NOTE — ED NOTES
Pt repositioned, jaden-care provided. [facility] contacted for transport back to SNF.  ETA 1.5 hours.

## 2022-10-07 NOTE — ED NOTES
Pt using call light appropriately.  Is having urinary urgency and frequency.  Continues to deny pain or nausea.

## 2022-10-09 LAB — BACTERIA UR CULT: NO GROWTH

## 2022-12-30 ENCOUNTER — HOSPITAL ENCOUNTER (EMERGENCY)
Facility: OTHER | Age: 87
Discharge: NURSING, REHAB OR LONG-TERM CARE | End: 2022-12-30
Attending: FAMILY MEDICINE | Admitting: FAMILY MEDICINE
Payer: COMMERCIAL

## 2022-12-30 VITALS
WEIGHT: 182 LBS | BODY MASS INDEX: 27.67 KG/M2 | SYSTOLIC BLOOD PRESSURE: 170 MMHG | DIASTOLIC BLOOD PRESSURE: 80 MMHG | OXYGEN SATURATION: 97 % | RESPIRATION RATE: 16 BRPM | HEART RATE: 90 BPM | TEMPERATURE: 98.7 F

## 2022-12-30 DIAGNOSIS — F91.9 DISRUPTIVE BEHAVIOR DISORDER: ICD-10-CM

## 2022-12-30 LAB
ALBUMIN UR-MCNC: 10 MG/DL
ANION GAP SERPL CALCULATED.3IONS-SCNC: 10 MMOL/L (ref 7–15)
APPEARANCE UR: CLEAR
BASOPHILS # BLD AUTO: 0.1 10E3/UL (ref 0–0.2)
BASOPHILS NFR BLD AUTO: 1 %
BILIRUB UR QL STRIP: NEGATIVE
BUN SERPL-MCNC: 17.8 MG/DL (ref 8–23)
CALCIUM SERPL-MCNC: 8.6 MG/DL (ref 8.2–9.6)
CHLORIDE SERPL-SCNC: 104 MMOL/L (ref 98–107)
COLOR UR AUTO: YELLOW
CREAT SERPL-MCNC: 1.3 MG/DL (ref 0.51–0.95)
DEPRECATED HCO3 PLAS-SCNC: 24 MMOL/L (ref 22–29)
EOSINOPHIL # BLD AUTO: 0.4 10E3/UL (ref 0–0.7)
EOSINOPHIL NFR BLD AUTO: 5 %
ERYTHROCYTE [DISTWIDTH] IN BLOOD BY AUTOMATED COUNT: 15.8 % (ref 10–15)
GFR SERPL CREATININE-BSD FRML MDRD: 38 ML/MIN/1.73M2
GLUCOSE SERPL-MCNC: 111 MG/DL (ref 70–99)
GLUCOSE UR STRIP-MCNC: NEGATIVE MG/DL
HCT VFR BLD AUTO: 29.7 % (ref 35–47)
HGB BLD-MCNC: 9.8 G/DL (ref 11.7–15.7)
HGB UR QL STRIP: NEGATIVE
HOLD SPECIMEN: NORMAL
IMM GRANULOCYTES # BLD: 0.1 10E3/UL
IMM GRANULOCYTES NFR BLD: 1 %
KETONES UR STRIP-MCNC: NEGATIVE MG/DL
LEUKOCYTE ESTERASE UR QL STRIP: NEGATIVE
LYMPHOCYTES # BLD AUTO: 1 10E3/UL (ref 0.8–5.3)
LYMPHOCYTES NFR BLD AUTO: 16 %
MCH RBC QN AUTO: 31 PG (ref 26.5–33)
MCHC RBC AUTO-ENTMCNC: 33 G/DL (ref 31.5–36.5)
MCV RBC AUTO: 94 FL (ref 78–100)
MONOCYTES # BLD AUTO: 0.6 10E3/UL (ref 0–1.3)
MONOCYTES NFR BLD AUTO: 9 %
NEUTROPHILS # BLD AUTO: 4.4 10E3/UL (ref 1.6–8.3)
NEUTROPHILS NFR BLD AUTO: 68 %
NITRATE UR QL: NEGATIVE
NRBC # BLD AUTO: 0 10E3/UL
NRBC BLD AUTO-RTO: 0 /100
PH UR STRIP: 7 [PH] (ref 5–9)
PLATELET # BLD AUTO: 185 10E3/UL (ref 150–450)
POTASSIUM SERPL-SCNC: 4.7 MMOL/L (ref 3.4–5.3)
RBC # BLD AUTO: 3.16 10E6/UL (ref 3.8–5.2)
RBC URINE: 0 /HPF
SODIUM SERPL-SCNC: 138 MMOL/L (ref 136–145)
SP GR UR STRIP: 1.01 (ref 1–1.03)
UROBILINOGEN UR STRIP-MCNC: NORMAL MG/DL
WBC # BLD AUTO: 6.4 10E3/UL (ref 4–11)
WBC URINE: 1 /HPF

## 2022-12-30 PROCEDURE — 93005 ELECTROCARDIOGRAM TRACING: CPT | Performed by: FAMILY MEDICINE

## 2022-12-30 PROCEDURE — 99284 EMERGENCY DEPT VISIT MOD MDM: CPT | Performed by: FAMILY MEDICINE

## 2022-12-30 PROCEDURE — 85025 COMPLETE CBC W/AUTO DIFF WBC: CPT | Performed by: FAMILY MEDICINE

## 2022-12-30 PROCEDURE — 80048 BASIC METABOLIC PNL TOTAL CA: CPT | Performed by: FAMILY MEDICINE

## 2022-12-30 PROCEDURE — 99283 EMERGENCY DEPT VISIT LOW MDM: CPT | Performed by: FAMILY MEDICINE

## 2022-12-30 PROCEDURE — 93010 ELECTROCARDIOGRAM REPORT: CPT | Performed by: INTERNAL MEDICINE

## 2022-12-30 PROCEDURE — 81001 URINALYSIS AUTO W/SCOPE: CPT | Performed by: FAMILY MEDICINE

## 2022-12-30 PROCEDURE — 36415 COLL VENOUS BLD VENIPUNCTURE: CPT | Performed by: FAMILY MEDICINE

## 2022-12-30 ASSESSMENT — ACTIVITIES OF DAILY LIVING (ADL): ADLS_ACUITY_SCORE: 35

## 2022-12-31 NOTE — ED TRIAGE NOTES
"Pt presents to ED via [facility] from [facility] for c/o agitation and refusal to take medications. Per EMS, pt has refused to take her medications x 4 days. Also stated staff had reported pt being agitated and combative. On arrival, pt agitated, stated \"all this for money\". Pt also stated \"I have to go the bathroom\" several times although EMS reported pt went PTA. Purewick applied, pt resting on cot.  BP (!) 170/80   Pulse 90   Temp 98.7  F (37.1  C) (Tympanic)   Resp 16   Wt 82.6 kg (182 lb)   SpO2 97%   BMI 27.67 kg/m         Triage Assessment     Row Name 12/30/22 2000       Triage Assessment (Adult)    Airway WDL WDL       Respiratory WDL    Respiratory WDL WDL       Skin Circulation/Temperature WDL    Skin Circulation/Temperature WDL WDL       Cardiac WDL    Cardiac WDL WDL       Peripheral/Neurovascular WDL    Peripheral Neurovascular WDL WDL       Cognitive/Neuro/Behavioral WDL    Cognitive/Neuro/Behavioral WDL X    Level of Consciousness confused              "

## 2022-12-31 NOTE — DISCHARGE INSTRUCTIONS
I think that Dunia would benefit from referral for Memory Care services. This would provide more one-to-one care for her and I think she would appreciate that level of care.     Thank you for choosing our Emergency Department for your care.     You may receive a phone call or letter for a survey about your care in our ED.  Please complete this as this is how we improve care for our patients.     If you have any questions after leaving the ED you can call or text me on my cell phone at [phone] and I will get back to you at some point. This does not mean that I am on call and if you are not doing well please return to the ED.     Sincerely,    Dr Keith Mcgee M.D.

## 2022-12-31 NOTE — ED PROVIDER NOTES
"  History     Chief Complaint   Patient presents with     Altered Mental Status     The history is provided by the patient and medical records.     Dunia Forbes is a 89 year old female here via EMS from [facility]. She tells me she has some pain in the left lower chest but cannot tell me how long it has been there or how bad it is. In the next breath she says that she is fine.      Per EMS: They were called due to concerns of agitation and refusing to take her meds for the past four days.     She has here for altered mental status in October and was found to have a UTI at that time.  She is not on blood thinners.    Allergies:  Allergies   Allergen Reactions     Amitriptyline Itching     Atorvastatin Itching     Chondroitin Analogues [Chondroitin Sulfate A] Unknown     Knee pain     Lisinopril Cough     Pneumococcal Vaccine Unknown     \"Pneumonia shot\" allergy per outside documentation.     Pravachol [Pravastatin] Itching     Sulfa (Sulfonamide Antibiotics) [Sulfa Drugs] Unknown     Allergy to \"sulfa\" is per outside documentation.       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     Priority: Medium     Facial droop 11/01/2018     Priority: Medium     Facial abrasion, initial encounter 02/08/2018     Priority: Medium     Facial laceration, initial encounter 02/08/2018     Priority: Medium     Knee abrasion, right, initial encounter 02/08/2018     Priority: Medium     Right shoulder injury, initial encounter 02/08/2018     Priority: Medium     Other elevated white blood cell (WBC) count      Priority: Medium     Closed fracture of right hip with routine healing 06/09/2017     Priority: Medium     Elevated blood pressure      Priority: Medium     Glaucoma 05/27/2016     Priority: Medium     Macular degeneration 05/27/2016     Priority: Medium     Constipation 05/24/2016     Priority: Medium     Lt ICA 70% Stenosis 05/17/2016     Priority: " Medium     Weakness 05/10/2016     Priority: Medium     Malignant hypertension 05/10/2016     Priority: Medium     Recurrent falls while walking 05/10/2016     Priority: Medium     Exertional chest pain 11/09/2015     Priority: Medium     Anemia 12/04/2014     Priority: Medium     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     Priority: Medium     CAD (coronary artery disease) 11/30/2014     Priority: Medium     DM (diabetes mellitus) (H) 11/30/2014     Priority: Medium     Primary hypertension 11/30/2014     Priority: Medium     Dyslipidemia 11/30/2014     Priority: Medium     NSTEMI (non-ST elevated myocardial infarction) (H) 11/30/2014     Priority: Medium     Abnormal nuclear stress test 11/30/2014     Priority: Medium        Past Medical History:    Past Medical History:   Diagnosis Date     Arthritis      Diabetes mellitus (H)      GERD (gastroesophageal reflux disease)      Glaucoma      Hypercholesteremia      Hypertension      Macular degeneration        Past Surgical History:    Past Surgical History:   Procedure Laterality Date     BYPASS GRAFT ARTERY CORONARY N/A 12/3/2014    Procedure: CORONARY ARTERY BYPASS X 3 WITH INTERNAL MAMMARY ARTERY, ENDOSCOPIC SAPHENOUS VEIN HARVEST ANESTHESIA TRANSESOPHAGEAL ECHOCARDIOGRAM ( Rm 4036 );  Surgeon: Nathalia Douglas MD;  Location: [facility];  Service:      CATARACT EXTRACTION       CATARACT IOL, RT/LT       ESOPHAGOSCOPY, GASTROSCOPY, DUODENOSCOPY (EGD), COMBINED  11/29/2012    Procedure: COMBINED ESOPHAGOSCOPY, GASTROSCOPY, DUODENOSCOPY (EGD), BIOPSY SINGLE OR MULTIPLE;  COMBINED ESOPHAGOSCOPY, GASTROSCOPY, DUODENOSCOPY (EGD) ;  Surgeon: Nick Chawla MD;  Location: [facility] PARTIAL HIP REPLACEMENT Right 6/10/2017    Procedure: RIGHT HIP HEMIARTHROPLASTY;  Surgeon: Fabian Cortez MD;  [place] [facility]  Service: [place]opedics       Family History:    No family history on file.    Social History:  Marital Status:  Single [1]  Social History      Tobacco Use     Smoking status: Never     Smokeless tobacco: Never   Substance Use Topics     Alcohol use: No     Drug use: No        Medications:    ASPIRIN PO  Calcium Carbonate (CALTRATE 600 PO)  cefuroxime (CEFTIN) 250 MG tablet  Ezetimibe (ZETIA PO)  Ferrous Sulfate (IRON SUPPLEMENT PO)  GEMFIBROZIL PO  GLUCOSAMINE SULFATE PO  latanoprost (XALATAN) 0.005 % ophthalmic solution  METFORMIN HCL PO  Multiple Vitamins-Minerals (ICAPS) CAPS  Omeprazole (PRILOSEC PO)  Soft Lens Products (OPTI-FREE EXPRESS)  Valsartan (DIOVAN PO)  VITAMIN A PO      Review of Systems   Unable to perform ROS: Other   The patient is not saying much to me about ROS      Physical Exam   BP: (!) 170/80  Pulse: 90  Temp: 98.7  F (37.1  C)  Resp: 16  Weight: 82.6 kg (182 lb)  SpO2: 97 %      Physical Exam  Vitals and nursing note reviewed.   Constitutional:       General: She is not in acute distress.     Appearance: She is well-developed. She is not ill-appearing, toxic-appearing or diaphoretic.      Comments: She is awake and looks comfortable   Cardiovascular:      Rate and Rhythm: Normal rate and regular rhythm.      Heart sounds: Normal heart sounds. No murmur heard.  Pulmonary:      Effort: Pulmonary effort is normal. No respiratory distress.      Breath sounds: Normal breath sounds. No stridor. No wheezing, rhonchi or rales.   Abdominal:      General: Bowel sounds are normal.      Palpations: Abdomen is soft.      Tenderness: There is no abdominal tenderness.   Musculoskeletal:         General: No swelling or tenderness.   Skin:     General: Skin is warm and dry.      Findings: No rash.   Neurological:      Mental Status: She is alert.   Psychiatric:         Mood and Affect: Mood normal.       EKG: sinus rhythm, bifascicular block, rate 89, no change from 2018.     Results for orders placed or performed during the hospital encounter of 12/30/22 (from the past 24 hour(s))   CBC with platelets differential    Narrative    The following  orders were created for panel order CBC with platelets differential.  Procedure                               Abnormality         Status                     ---------                               -----------         ------                     CBC with platelets and d...[[id number]]  Abnormal            Final result                 Please view results for these tests on the individual orders.   Basic metabolic panel   Result Value Ref Range    Sodium 138 136 - 145 mmol/L    Potassium 4.7 3.4 - 5.3 mmol/L    Chloride 104 98 - 107 mmol/L    Carbon Dioxide (CO2) 24 22 - 29 mmol/L    Anion Gap 10 7 - 15 mmol/L    Urea Nitrogen 17.8 8.0 - 23.0 mg/dL    Creatinine 1.30 (H) 0.51 - 0.95 mg/dL    Calcium 8.6 8.2 - 9.6 mg/dL    Glucose 111 (H) 70 - 99 mg/dL    GFR Estimate 38 (L) >60 mL/min/1.73m2   Extra Tube    Narrative    The following orders were created for panel order Extra Tube.  Procedure                               Abnormality         Status                     ---------                               -----------         ------                     Extra Blue Top Tube[[id number]]                              In process                 Extra Serum Separator Tu...[[id number]]                      In process                 Extra Green Top (Lithium...[[id number]]                      In process                   Please view results for these tests on the individual orders.   CBC with platelets and differential   Result Value Ref Range    WBC Count 6.4 4.0 - 11.0 10e3/uL    RBC Count 3.16 (L) 3.80 - 5.20 10e6/uL    Hemoglobin 9.8 (L) 11.7 - 15.7 g/dL    Hematocrit 29.7 (L) 35.0 - 47.0 %    MCV 94 78 - 100 fL    MCH 31.0 26.5 - 33.0 pg    MCHC 33.0 31.5 - 36.5 g/dL    RDW 15.8 (H) 10.0 - 15.0 %    Platelet Count 185 150 - 450 10e3/uL    % Neutrophils 68 %    % Lymphocytes 16 %    % Monocytes 9 %    % Eosinophils 5 %    % Basophils 1 %    % Immature Granulocytes 1 %    NRBCs per 100 WBC 0 <1 /100    Absolute Neutrophils 4.4  1.6 - 8.3 10e3/uL    Absolute Lymphocytes 1.0 0.8 - 5.3 10e3/uL    Absolute Monocytes 0.6 0.0 - 1.3 10e3/uL    Absolute Eosinophils 0.4 0.0 - 0.7 10e3/uL    Absolute Basophils 0.1 0.0 - 0.2 10e3/uL    Absolute Immature Granulocytes 0.1 <=0.4 10e3/uL    Absolute NRBCs 0.0 10e3/uL   UA with Microscopic reflex to Culture    Specimen: Urine, Catheter   Result Value Ref Range    Color Urine Yellow Colorless, Straw, Light Yellow, Yellow    Appearance Urine Clear Clear    Glucose Urine Negative Negative mg/dL    Bilirubin Urine Negative Negative    Ketones Urine Negative Negative mg/dL    Specific Gravity Urine 1.009 1.000 - 1.030    Blood Urine Negative Negative    pH Urine 7.0 5.0 - 9.0    Protein Albumin Urine 10 (A) Negative mg/dL    Urobilinogen Urine Normal Normal, 2.0 mg/dL    Nitrite Urine Negative Negative    Leukocyte Esterase Urine Negative Negative    RBC Urine 0 <=2 /HPF    WBC Urine 1 <=5 /HPF    Narrative    Urine Culture not indicated       Medications - No data to display    Assessments & Plan (with Medical Decision Making)  Dunia Forbes is a 89 year old female here via EMS from [facility]. She tells me she has some pain in the left lower chest but cannot tell me how long it has been there or how bad it is. In the next breath she says that she is fine.  Per EMS: They were called due to concerns of agitation and refusing to take her meds for the past four days.  She has here for altered mental status in October and was found to have a UTI at that time.  She is not on blood thinners.  VS in the ED on room air BP (!) 170/80   Pulse 90   Temp 98.7  F (37.1  C) (Tympanic)   Resp 16   Wt 82.6 kg (182 lb)   SpO2 97%   BMI 27.67 kg/m    Exam shows no focal findings.  EKG stable.  Labs show CBC with hgb 9.8 (stable), BMP with Cr 1.30 (improved), UA negative for UTI.    9:27 PM  I spoke with staff from [facility] and we will recommend memory care for her.  She is in assisted living and [facility] does  not provide transportation for assisted living patients.  No new meds and no medication changes recommended at this time.     I have reviewed the nursing notes.    I have reviewed the findings, diagnosis, plan and need for follow up with the patient.    Medical Decision Making  The patient presented with a problem that is a chronic illness mild to moderate exacerbation, progression, or side effect of treatment.    The patient's evaluation involved:  an assessment requiring an independent historian (see separate area of note for details)  ordering and review of 3+ test(s) (see separate area of note for details)    The patient's management involved only simple and very low risk treatment.      Final diagnoses:   Disruptive behavior disorder       12/30/2022   [facility], Tommie Peralta MD  12/30/22 3445

## 2023-01-01 ENCOUNTER — APPOINTMENT (OUTPATIENT)
Dept: GENERAL RADIOLOGY | Facility: OTHER | Age: 88
End: 2023-01-01
Attending: STUDENT IN AN ORGANIZED HEALTH CARE EDUCATION/TRAINING PROGRAM
Payer: COMMERCIAL

## 2023-01-01 ENCOUNTER — HOSPITAL ENCOUNTER (EMERGENCY)
Facility: OTHER | Age: 88
Discharge: HOME | End: 2023-04-22
Attending: STUDENT IN AN ORGANIZED HEALTH CARE EDUCATION/TRAINING PROGRAM | Admitting: STUDENT IN AN ORGANIZED HEALTH CARE EDUCATION/TRAINING PROGRAM
Payer: COMMERCIAL

## 2023-01-01 ENCOUNTER — HOSPITAL ENCOUNTER (EMERGENCY)
Facility: OTHER | Age: 88
Discharge: NURSING, REHAB OR LONG-TERM CARE | End: 2023-04-06
Attending: STUDENT IN AN ORGANIZED HEALTH CARE EDUCATION/TRAINING PROGRAM | Admitting: STUDENT IN AN ORGANIZED HEALTH CARE EDUCATION/TRAINING PROGRAM
Payer: COMMERCIAL

## 2023-01-01 ENCOUNTER — APPOINTMENT (OUTPATIENT)
Dept: GENERAL RADIOLOGY | Facility: OTHER | Age: 88
End: 2023-01-01
Attending: NURSE PRACTITIONER
Payer: COMMERCIAL

## 2023-01-01 ENCOUNTER — DOCUMENTATION ONLY (OUTPATIENT)
Dept: OTHER | Facility: CLINIC | Age: 88
End: 2023-01-01
Payer: COMMERCIAL

## 2023-01-01 VITALS
SYSTOLIC BLOOD PRESSURE: 199 MMHG | TEMPERATURE: 98.4 F | HEART RATE: 94 BPM | OXYGEN SATURATION: 96 % | RESPIRATION RATE: 10 BRPM | DIASTOLIC BLOOD PRESSURE: 85 MMHG

## 2023-01-01 VITALS
BODY MASS INDEX: 27.58 KG/M2 | SYSTOLIC BLOOD PRESSURE: 200 MMHG | TEMPERATURE: 98.9 F | WEIGHT: 182 LBS | OXYGEN SATURATION: 94 % | RESPIRATION RATE: 16 BRPM | DIASTOLIC BLOOD PRESSURE: 83 MMHG | HEART RATE: 70 BPM | HEIGHT: 68 IN
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
## 2023-01-01 DIAGNOSIS — R07.9 CHEST PAIN, UNSPECIFIED TYPE: ICD-10-CM

## 2023-01-01 LAB
ALBUMIN UR-MCNC: 30 MG/DL
ANION GAP SERPL CALCULATED.3IONS-SCNC: 10 MMOL/L (ref 7–15)
ANION GAP SERPL CALCULATED.3IONS-SCNC: 11 MMOL/L (ref 7–15)
APPEARANCE UR: CLEAR
ATRIAL RATE - MUSE: 85 BPM
ATRIAL RATE - MUSE: 87 BPM
BACTERIA UR CULT: NO GROWTH
BASOPHILS # BLD AUTO: 0 10E3/UL (ref 0–0.2)
BASOPHILS # BLD AUTO: 0.1 10E3/UL (ref 0–0.2)
BASOPHILS NFR BLD AUTO: 0 %
BASOPHILS NFR BLD AUTO: 1 %
BILIRUB UR QL STRIP: NEGATIVE
BUN SERPL-MCNC: 10.2 MG/DL (ref 8–23)
BUN SERPL-MCNC: 16 MG/DL (ref 8–23)
CALCIUM SERPL-MCNC: 8.9 MG/DL (ref 8.2–9.6)
CALCIUM SERPL-MCNC: 9.2 MG/DL (ref 8.2–9.6)
CHLORIDE SERPL-SCNC: 100 MMOL/L (ref 98–107)
CHLORIDE SERPL-SCNC: 102 MMOL/L (ref 98–107)
COLOR UR AUTO: ABNORMAL
CREAT SERPL-MCNC: 1.04 MG/DL (ref 0.51–0.95)
CREAT SERPL-MCNC: 1.15 MG/DL (ref 0.51–0.95)
DEPRECATED HCO3 PLAS-SCNC: 26 MMOL/L (ref 22–29)
DEPRECATED HCO3 PLAS-SCNC: 28 MMOL/L (ref 22–29)
DIASTOLIC BLOOD PRESSURE - MUSE: NORMAL MMHG
DIASTOLIC BLOOD PRESSURE - MUSE: NORMAL MMHG
EOSINOPHIL # BLD AUTO: 0.1 10E3/UL (ref 0–0.7)
EOSINOPHIL # BLD AUTO: 0.4 10E3/UL (ref 0–0.7)
EOSINOPHIL NFR BLD AUTO: 2 %
EOSINOPHIL NFR BLD AUTO: 5 %
ERYTHROCYTE [DISTWIDTH] IN BLOOD BY AUTOMATED COUNT: 14.1 % (ref 10–15)
ERYTHROCYTE [DISTWIDTH] IN BLOOD BY AUTOMATED COUNT: 14.1 % (ref 10–15)
GFR SERPL CREATININE-BSD FRML MDRD: 44 ML/MIN/1.73M2
GFR SERPL CREATININE-BSD FRML MDRD: 50 ML/MIN/1.73M2
GLUCOSE SERPL-MCNC: 126 MG/DL (ref 70–99)
GLUCOSE SERPL-MCNC: 128 MG/DL (ref 70–99)
GLUCOSE UR STRIP-MCNC: NEGATIVE MG/DL
HCT VFR BLD AUTO: 35.7 % (ref 35–47)
HCT VFR BLD AUTO: 35.8 % (ref 35–47)
HGB BLD-MCNC: 11.8 G/DL (ref 11.7–15.7)
HGB BLD-MCNC: 11.9 G/DL (ref 11.7–15.7)
HGB UR QL STRIP: NEGATIVE
HOLD SPECIMEN: NORMAL
IMM GRANULOCYTES # BLD: 0.1 10E3/UL
IMM GRANULOCYTES # BLD: 0.1 10E3/UL
IMM GRANULOCYTES NFR BLD: 1 %
IMM GRANULOCYTES NFR BLD: 1 %
INTERPRETATION ECG - MUSE: NORMAL
INTERPRETATION ECG - MUSE: NORMAL
KETONES UR STRIP-MCNC: NEGATIVE MG/DL
LEUKOCYTE ESTERASE UR QL STRIP: NEGATIVE
LYMPHOCYTES # BLD AUTO: 0.8 10E3/UL (ref 0.8–5.3)
LYMPHOCYTES # BLD AUTO: 1.2 10E3/UL (ref 0.8–5.3)
LYMPHOCYTES NFR BLD AUTO: 15 %
LYMPHOCYTES NFR BLD AUTO: 9 %
MAGNESIUM SERPL-MCNC: 1.9 MG/DL (ref 1.7–2.3)
MAGNESIUM SERPL-MCNC: 2 MG/DL (ref 1.7–2.3)
MCH RBC QN AUTO: 30.4 PG (ref 26.5–33)
MCH RBC QN AUTO: 30.5 PG (ref 26.5–33)
MCHC RBC AUTO-ENTMCNC: 33.1 G/DL (ref 31.5–36.5)
MCHC RBC AUTO-ENTMCNC: 33.2 G/DL (ref 31.5–36.5)
MCV RBC AUTO: 92 FL (ref 78–100)
MCV RBC AUTO: 92 FL (ref 78–100)
MONOCYTES # BLD AUTO: 0.7 10E3/UL (ref 0–1.3)
MONOCYTES # BLD AUTO: 0.8 10E3/UL (ref 0–1.3)
MONOCYTES NFR BLD AUTO: 10 %
MONOCYTES NFR BLD AUTO: 8 %
NEUTROPHILS # BLD AUTO: 5.5 10E3/UL (ref 1.6–8.3)
NEUTROPHILS # BLD AUTO: 6.9 10E3/UL (ref 1.6–8.3)
NEUTROPHILS NFR BLD AUTO: 68 %
NEUTROPHILS NFR BLD AUTO: 80 %
NITRATE UR QL: NEGATIVE
NRBC # BLD AUTO: 0 10E3/UL
NRBC # BLD AUTO: 0 10E3/UL
NRBC BLD AUTO-RTO: 0 /100
NRBC BLD AUTO-RTO: 0 /100
NT-PROBNP SERPL-MCNC: 3938 PG/ML (ref 0–1800)
P AXIS - MUSE: 58 DEGREES
P AXIS - MUSE: 87 DEGREES
PH UR STRIP: 7.5 [PH] (ref 5–9)
PLATELET # BLD AUTO: 202 10E3/UL (ref 150–450)
PLATELET # BLD AUTO: 249 10E3/UL (ref 150–450)
POTASSIUM SERPL-SCNC: 4.4 MMOL/L (ref 3.4–5.3)
POTASSIUM SERPL-SCNC: 4.4 MMOL/L (ref 3.4–5.3)
PR INTERVAL - MUSE: 136 MS
PR INTERVAL - MUSE: 146 MS
QRS DURATION - MUSE: 106 MS
QRS DURATION - MUSE: 128 MS
QT - MUSE: 410 MS
QT - MUSE: 420 MS
QTC - MUSE: 487 MS
QTC - MUSE: 505 MS
R AXIS - MUSE: -48 DEGREES
R AXIS - MUSE: 262 DEGREES
RBC # BLD AUTO: 3.87 10E6/UL (ref 3.8–5.2)
RBC # BLD AUTO: 3.91 10E6/UL (ref 3.8–5.2)
RBC URINE: 1 /HPF
SODIUM SERPL-SCNC: 138 MMOL/L (ref 136–145)
SODIUM SERPL-SCNC: 139 MMOL/L (ref 136–145)
SP GR UR STRIP: 1.01 (ref 1–1.03)
SYSTOLIC BLOOD PRESSURE - MUSE: NORMAL MMHG
SYSTOLIC BLOOD PRESSURE - MUSE: NORMAL MMHG
T AXIS - MUSE: -10 DEGREES
T AXIS - MUSE: 13 DEGREES
TROPONIN T SERPL HS-MCNC: 86 NG/L
TROPONIN T SERPL HS-MCNC: 89 NG/L
UROBILINOGEN UR STRIP-MCNC: NORMAL MG/DL
VENTRICULAR RATE- MUSE: 85 BPM
VENTRICULAR RATE- MUSE: 87 BPM
WBC # BLD AUTO: 8 10E3/UL (ref 4–11)
WBC # BLD AUTO: 8.6 10E3/UL (ref 4–11)
WBC URINE: 1 /HPF

## 2023-01-01 PROCEDURE — 85014 HEMATOCRIT: CPT | Performed by: STUDENT IN AN ORGANIZED HEALTH CARE EDUCATION/TRAINING PROGRAM

## 2023-01-01 PROCEDURE — 93005 ELECTROCARDIOGRAM TRACING: CPT | Performed by: NURSE PRACTITIONER

## 2023-01-01 PROCEDURE — 99284 EMERGENCY DEPT VISIT MOD MDM: CPT | Performed by: NURSE PRACTITIONER

## 2023-01-01 PROCEDURE — 80048 BASIC METABOLIC PNL TOTAL CA: CPT | Performed by: STUDENT IN AN ORGANIZED HEALTH CARE EDUCATION/TRAINING PROGRAM

## 2023-01-01 PROCEDURE — 93010 ELECTROCARDIOGRAM REPORT: CPT | Performed by: INTERNAL MEDICINE

## 2023-01-01 PROCEDURE — 85025 COMPLETE CBC W/AUTO DIFF WBC: CPT | Performed by: NURSE PRACTITIONER

## 2023-01-01 PROCEDURE — 72070 X-RAY EXAM THORAC SPINE 2VWS: CPT

## 2023-01-01 PROCEDURE — 99285 EMERGENCY DEPT VISIT HI MDM: CPT | Mod: 25 | Performed by: NURSE PRACTITIONER

## 2023-01-01 PROCEDURE — 36415 COLL VENOUS BLD VENIPUNCTURE: CPT | Performed by: NURSE PRACTITIONER

## 2023-01-01 PROCEDURE — 99283 EMERGENCY DEPT VISIT LOW MDM: CPT | Performed by: STUDENT IN AN ORGANIZED HEALTH CARE EDUCATION/TRAINING PROGRAM

## 2023-01-01 PROCEDURE — 93005 ELECTROCARDIOGRAM TRACING: CPT | Performed by: STUDENT IN AN ORGANIZED HEALTH CARE EDUCATION/TRAINING PROGRAM

## 2023-01-01 PROCEDURE — 83880 ASSAY OF NATRIURETIC PEPTIDE: CPT | Performed by: NURSE PRACTITIONER

## 2023-01-01 PROCEDURE — 87086 URINE CULTURE/COLONY COUNT: CPT | Performed by: NURSE PRACTITIONER

## 2023-01-01 PROCEDURE — 72100 X-RAY EXAM L-S SPINE 2/3 VWS: CPT

## 2023-01-01 PROCEDURE — 83735 ASSAY OF MAGNESIUM: CPT | Performed by: STUDENT IN AN ORGANIZED HEALTH CARE EDUCATION/TRAINING PROGRAM

## 2023-01-01 PROCEDURE — 99285 EMERGENCY DEPT VISIT HI MDM: CPT | Mod: 25 | Performed by: STUDENT IN AN ORGANIZED HEALTH CARE EDUCATION/TRAINING PROGRAM

## 2023-01-01 PROCEDURE — 71045 X-RAY EXAM CHEST 1 VIEW: CPT

## 2023-01-01 PROCEDURE — 36415 COLL VENOUS BLD VENIPUNCTURE: CPT | Performed by: STUDENT IN AN ORGANIZED HEALTH CARE EDUCATION/TRAINING PROGRAM

## 2023-01-01 PROCEDURE — 84484 ASSAY OF TROPONIN QUANT: CPT | Mod: 91 | Performed by: NURSE PRACTITIONER

## 2023-01-01 PROCEDURE — 82310 ASSAY OF CALCIUM: CPT | Performed by: NURSE PRACTITIONER

## 2023-01-01 PROCEDURE — 81001 URINALYSIS AUTO W/SCOPE: CPT | Performed by: NURSE PRACTITIONER

## 2023-01-01 PROCEDURE — 83735 ASSAY OF MAGNESIUM: CPT | Performed by: NURSE PRACTITIONER

## 2023-01-01 PROCEDURE — 71045 X-RAY EXAM CHEST 1 VIEW: CPT | Mod: TC

## 2023-01-01 RX ORDER — ASPIRIN 325 MG
325 TABLET ORAL ONCE
Status: DISCONTINUED | OUTPATIENT
Start: 2023-01-01 | End: 2023-01-01 | Stop reason: HOSPADM

## 2023-01-01 ASSESSMENT — ACTIVITIES OF DAILY LIVING (ADL)
ADLS_ACUITY_SCORE: 35

## 2023-01-01 ASSESSMENT — ENCOUNTER SYMPTOMS
RESPIRATORY NEGATIVE: 1
GASTROINTESTINAL NEGATIVE: 1
HEMATOLOGIC/LYMPHATIC NEGATIVE: 1
PSYCHIATRIC NEGATIVE: 1
CONSTITUTIONAL NEGATIVE: 1
PALPITATIONS: 0
ALLERGIC/IMMUNOLOGIC NEGATIVE: 1
NEUROLOGICAL NEGATIVE: 1
MUSCULOSKELETAL NEGATIVE: 1

## 2023-02-11 ENCOUNTER — HOSPITAL ENCOUNTER (EMERGENCY)
Facility: OTHER | Age: 88
Discharge: NURSING, REHAB OR LONG-TERM CARE | End: 2023-02-11
Attending: FAMILY MEDICINE | Admitting: FAMILY MEDICINE
Payer: COMMERCIAL

## 2023-02-11 VITALS
HEART RATE: 96 BPM | TEMPERATURE: 98 F | SYSTOLIC BLOOD PRESSURE: 182 MMHG | OXYGEN SATURATION: 96 % | DIASTOLIC BLOOD PRESSURE: 91 MMHG | RESPIRATION RATE: 16 BRPM

## 2023-02-11 DIAGNOSIS — F03.911 DEMENTIA WITH AGITATION, UNSPECIFIED DEMENTIA SEVERITY, UNSPECIFIED DEMENTIA TYPE (H): ICD-10-CM

## 2023-02-11 PROCEDURE — 99283 EMERGENCY DEPT VISIT LOW MDM: CPT | Performed by: FAMILY MEDICINE

## 2023-02-11 PROCEDURE — 99283 EMERGENCY DEPT VISIT LOW MDM: CPT

## 2023-02-11 PROCEDURE — 250N000013 HC RX MED GY IP 250 OP 250 PS 637: Performed by: FAMILY MEDICINE

## 2023-02-11 RX ORDER — OLANZAPINE 2.5 MG/1
5 TABLET, FILM COATED ORAL ONCE
Status: COMPLETED | OUTPATIENT
Start: 2023-02-11 | End: 2023-02-11

## 2023-02-11 RX ORDER — DIVALPROEX SODIUM 250 MG/1
250 TABLET, DELAYED RELEASE ORAL PRN
COMMUNITY

## 2023-02-11 RX ORDER — NITROGLYCERIN 0.4 MG/1
0.4 TABLET SUBLINGUAL
COMMUNITY

## 2023-02-11 RX ORDER — RISPERIDONE 0.5 MG/1
0.25 TABLET ORAL PRN
COMMUNITY

## 2023-02-11 RX ORDER — AMLODIPINE BESYLATE 10 MG/1
10 TABLET ORAL DAILY
COMMUNITY

## 2023-02-11 RX ORDER — AMOXICILLIN 250 MG
1 CAPSULE ORAL DAILY PRN
COMMUNITY

## 2023-02-11 RX ORDER — TROLAMINE SALICYLATE 10 G/100G
CREAM TOPICAL 3 TIMES DAILY PRN
COMMUNITY

## 2023-02-11 RX ORDER — DIVALPROEX SODIUM 250 MG/1
250 TABLET, DELAYED RELEASE ORAL 2 TIMES DAILY
COMMUNITY

## 2023-02-11 RX ORDER — ACETAMINOPHEN 500 MG
1000 TABLET ORAL ONCE
Status: COMPLETED | OUTPATIENT
Start: 2023-02-11 | End: 2023-02-11

## 2023-02-11 RX ORDER — MAGNESIUM HYDROXIDE/ALUMINUM HYDROXICE/SIMETHICONE 120; 1200; 1200 MG/30ML; MG/30ML; MG/30ML
30 SUSPENSION ORAL 3 TIMES DAILY PRN
COMMUNITY

## 2023-02-11 RX ORDER — METOPROLOL TARTRATE 75 MG/1
75 TABLET, FILM COATED ORAL 2 TIMES DAILY
COMMUNITY

## 2023-02-11 RX ORDER — OLANZAPINE 5 MG/1
5 TABLET, ORALLY DISINTEGRATING ORAL EVERY 4 HOURS PRN
Qty: 30 TABLET | Refills: 0 | Status: SHIPPED | OUTPATIENT
Start: 2023-02-11 | End: 2023-01-01

## 2023-02-11 RX ORDER — OLANZAPINE 2.5 MG/1
5 TABLET, FILM COATED ORAL ONCE
Status: DISCONTINUED | OUTPATIENT
Start: 2023-02-11 | End: 2023-02-11 | Stop reason: HOSPADM

## 2023-02-11 RX ORDER — ACETAMINOPHEN 500 MG
1000 TABLET ORAL
COMMUNITY

## 2023-02-11 RX ORDER — IBUPROFEN 200 MG
CAPSULE ORAL 3 TIMES DAILY PRN
COMMUNITY

## 2023-02-11 RX ORDER — LORATADINE 10 MG/1
10 TABLET ORAL DAILY
COMMUNITY

## 2023-02-11 RX ORDER — MULTIVITAMIN WITH IRON
250 TABLET ORAL
COMMUNITY
End: 2023-02-11

## 2023-02-11 RX ORDER — GUAIFENESIN 200 MG/10ML
10 LIQUID ORAL EVERY 4 HOURS PRN
COMMUNITY

## 2023-02-11 RX ADMIN — OLANZAPINE 5 MG: 2.5 TABLET, FILM COATED ORAL at 19:50

## 2023-02-11 ASSESSMENT — ACTIVITIES OF DAILY LIVING (ADL)
ADLS_ACUITY_SCORE: 35
ADLS_ACUITY_SCORE: 35

## 2023-02-12 NOTE — ED NOTES
Report given to Rufina at [facility]. Attempted to call Latanya nurse without response.  [facility] called for report

## 2023-02-12 NOTE — ED PROVIDER NOTES
"  History     Chief Complaint   Patient presents with     Dementia     HPI  Dunia Forbes is a 89 year old female with history of dementia who presents to the ED with increased agitation [facility].  Patient's not been taking her medications at home for the last 11 days.    Reviewed nurses notes below, similar history is related to me.  Pt arrives with c/o worsening symptoms of dementia, increased agitation, and not taking medications for 11 days. Pt arrives via EMS from [facility].   Allergies:  Allergies   Allergen Reactions     Amitriptyline Itching     Atorvastatin Itching     Chondroitin Analogues [Chondroitin Sulfate A] Unknown     Knee pain     Lisinopril Cough     Pneumococcal Vaccine Unknown     \"Pneumonia shot\" allergy per outside documentation.     Pravachol [Pravastatin] Itching     Sulfa (Sulfonamide Antibiotics) [Sulfa Drugs] Unknown     Allergy to \"sulfa\" is per outside documentation.       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     Priority: Medium     Facial droop 11/01/2018     Priority: Medium     Facial abrasion, initial encounter 02/08/2018     Priority: Medium     Facial laceration, initial encounter 02/08/2018     Priority: Medium     Knee abrasion, right, initial encounter 02/08/2018     Priority: Medium     Right shoulder injury, initial encounter 02/08/2018     Priority: Medium     Other elevated white blood cell (WBC) count      Priority: Medium     Closed fracture of right hip with routine healing 06/09/2017     Priority: Medium     Elevated blood pressure      Priority: Medium     Glaucoma 05/27/2016     Priority: Medium     Macular degeneration 05/27/2016     Priority: Medium     Constipation 05/24/2016     Priority: Medium     Lt ICA 70% Stenosis 05/17/2016     Priority: Medium     Weakness 05/10/2016     Priority: Medium     Malignant hypertension 05/10/2016     Priority: Medium     Recurrent " falls while walking 05/10/2016     Priority: Medium     Exertional chest pain 11/09/2015     Priority: Medium     Anemia 12/04/2014     Priority: Medium     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     Priority: Medium     CAD (coronary artery disease) 11/30/2014     Priority: Medium     DM (diabetes mellitus) (H) 11/30/2014     Priority: Medium     Primary hypertension 11/30/2014     Priority: Medium     Dyslipidemia 11/30/2014     Priority: Medium     NSTEMI (non-ST elevated myocardial infarction) (H) 11/30/2014     Priority: Medium     Abnormal nuclear stress test 11/30/2014     Priority: Medium        Past Medical History:    Past Medical History:   Diagnosis Date     Arthritis      Diabetes mellitus (H)      GERD (gastroesophageal reflux disease)      Glaucoma      Hypercholesteremia      Hypertension      Macular degeneration        Past Surgical History:    Past Surgical History:   Procedure Laterality Date     BYPASS GRAFT ARTERY CORONARY N/A 12/3/2014    Procedure: CORONARY ARTERY BYPASS X 3 WITH INTERNAL MAMMARY ARTERY, ENDOSCOPIC SAPHENOUS VEIN HARVEST ANESTHESIA TRANSESOPHAGEAL ECHOCARDIOGRAM ( Rm 4036 );  Surgeon: Nathalia Douglas MD;  Location: [facility];  Service:      CATARACT EXTRACTION       CATARACT IOL, RT/LT       ESOPHAGOSCOPY, GASTROSCOPY, DUODENOSCOPY (EGD), COMBINED  11/29/2012    Procedure: COMBINED ESOPHAGOSCOPY, GASTROSCOPY, DUODENOSCOPY (EGD), BIOPSY SINGLE OR MULTIPLE;  COMBINED ESOPHAGOSCOPY, GASTROSCOPY, DUODENOSCOPY (EGD) ;  Surgeon: Nick Chawla MD;  Location: [facility] PARTIAL HIP REPLACEMENT Right 6/10/2017    Procedure: RIGHT HIP HEMIARTHROPLASTY;  Surgeon: Fabian Cortez MD;  Location: [facility];  Service: Orthopedics       Family History:    No family history on file.    Social History:  Marital Status:  Single [1]  Social History     Tobacco Use     Smoking status: Never     Smokeless tobacco: Never   Substance Use Topics     Alcohol use: No     Drug use:  "No        Medications:    acetaminophen (TYLENOL) 500 MG tablet  alum & mag hydroxide-simethicone (MAALOX) 200-200-20 MG/5ML SUSP suspension  amLODIPine (NORVASC) 10 MG tablet  artificial tears OINT ophthalmic ointment  divalproex sodium delayed-release (DEPAKOTE) 250 MG DR tablet  divalproex sodium delayed-release (DEPAKOTE) 250 MG DR tablet  Ferrous Sulfate (IRON SUPPLEMENT PO)  GEMFIBROZIL PO  glycerin (ADULT) 2 g suppository  guaiFENesin (ROBITUSSIN) 20 mg/mL liquid  latanoprost (XALATAN) 0.005 % ophthalmic solution  loratadine (CLARITIN) 10 MG tablet  magnesium hydroxide (MILK OF MAGNESIA) 400 MG/5ML suspension  Metoprolol Tartrate 75 MG TABS  Neomycin-Bacitracin-Polymyxin (TRIPLE ANTIBIOTIC) 3.5-400-5000 OINT ointment  nitroGLYcerin (NITROSTAT) 0.4 MG sublingual tablet  OLANZapine zydis (ZYPREXA) 5 MG ODT  ranitidine (ZANTAC) 300 MG tablet  risperiDONE (RISPERDAL) 0.5 MG tablet  senna-docusate (SENOKOT-S/PERICOLACE) 8.6-50 MG tablet  sertraline (ZOLOFT) 50 MG tablet  Soft Lens Products (OPTI-FREE EXPRESS)  trolamine salicylate (ASPERCREME) 10 % external cream  Valsartan (DIOVAN PO)  Zinc Oxide 13 % CREA          Review of Systems   Unable to perform ROS: Dementia       Physical Exam   BP: (!) 173/79  Pulse: 96  Temp: 98  F (36.7  C)  Resp: 16  SpO2: 95 %      Physical Exam  Vitals and nursing note reviewed.   Cardiovascular:      Rate and Rhythm: Normal rate.   Musculoskeletal:      Cervical back: Normal range of motion.   Neurological:      Mental Status: She is alert.     Examiner is able to have a conversation with the patient, she states her \"meds are not working\", she does not know where she is.  She seems to have a rational conversation but then will have outbursts of \"I need help, while you are helping me\"    ED Course          Behaviors in ED do seem consistent with dementia.  No results found for this or any previous visit (from the past 24 hour(s)).    Medications   OLANZapine (zyPREXA) tablet 5 mg " (has no administration in time range)   acetaminophen (TYLENOL) tablet 1,000 mg (1,000 mg Oral Not Given 2/11/23 2022)   OLANZapine (zyPREXA) tablet 5 mg (5 mg Oral Given 2/11/23 1950)     Patient did comply with oral medications in the ED.  Symptoms controlled well and she rested comfortably for several hour ED stay.  Will discharge patient home on Zyprexa ODT as perhaps she may take this and comply with this better at the nursing home.  Assessments & Plan (with Medical Decision Making)     I have reviewed the nursing notes.    I have reviewed the findings, diagnosis, plan and need for follow up with the patient.  Nurse to nurse report given back to Kate Gama  New Prescriptions    OLANZAPINE ZYDIS (ZYPREXA) 5 MG ODT    Take 1 tablet (5 mg) by mouth every 4 hours as needed for agitation       Final diagnoses:   Dementia with agitation, unspecified dementia severity, unspecified dementia type       2/11/2023   [facility] AND \[facility] [place] [facility]\""     Luis Chacko MD  02/13/23 0602

## 2023-02-12 NOTE — ED NOTES
Writer spoke with Latanya RAINES from [facility]. She stated that pt has been yelling out and asking to go to the ER all day. Pt has been name calling staff throughout the day. Pt has not taken any medications for eleven days, primary is aware of this. RN stated that hospice was being discussed. Call back number is [phone].

## 2023-02-12 NOTE — ED NOTES
"Patient frequently is yelling out \"help\" staff to bedside to assess needs. Patient is otherwise non-distressed.   "

## 2023-02-12 NOTE — ED NOTES
"Pt needs frequent redirection, yells out \"help\" often. Given drink, food, offered something for pain (she declined), assisted up to the commode to void  "

## 2023-02-12 NOTE — ED TRIAGE NOTES
Pt arrives with c/o worsening symptoms of dementia, increased agitation, and not taking medications for 11 days. Pt arrives via EMS from [facility].      Triage Assessment     Row Name 02/11/23 3034       Triage Assessment (Adult)    Airway WDL WDL       Respiratory WDL    Respiratory WDL WDL       Skin Circulation/Temperature WDL    Skin Circulation/Temperature WDL WDL       Cardiac WDL    Cardiac WDL WDL       Peripheral/Neurovascular WDL    Peripheral Neurovascular WDL WDL

## 2023-02-15 ENCOUNTER — LAB (OUTPATIENT)
Dept: LAB | Facility: OTHER | Age: 88
End: 2023-02-15
Attending: PHYSICIAN ASSISTANT
Payer: COMMERCIAL

## 2023-02-15 ENCOUNTER — HOSPITAL ENCOUNTER (EMERGENCY)
Facility: OTHER | Age: 88
Discharge: HOME | End: 2023-02-15
Attending: STUDENT IN AN ORGANIZED HEALTH CARE EDUCATION/TRAINING PROGRAM | Admitting: STUDENT IN AN ORGANIZED HEALTH CARE EDUCATION/TRAINING PROGRAM
Payer: COMMERCIAL

## 2023-02-15 VITALS
WEIGHT: 182 LBS | BODY MASS INDEX: 27.58 KG/M2 | OXYGEN SATURATION: 99 % | RESPIRATION RATE: 18 BRPM | HEART RATE: 106 BPM | SYSTOLIC BLOOD PRESSURE: 219 MMHG | HEIGHT: 68 IN | TEMPERATURE: 97.2 F | DIASTOLIC BLOOD PRESSURE: 116 MMHG

## 2023-02-15 DIAGNOSIS — Z71.1 CONCERN ABOUT DISEASE WITHOUT DIAGNOSIS: ICD-10-CM

## 2023-02-15 DIAGNOSIS — Z71.89 COUNSELING ON SUBSTANCE USE AND ABUSE: Primary | ICD-10-CM

## 2023-02-15 DIAGNOSIS — R82.998 OTHER ABNORMAL FINDINGS IN URINE: ICD-10-CM

## 2023-02-15 LAB
ALBUMIN SERPL BCG-MCNC: 4.1 G/DL (ref 3.5–5.2)
ALBUMIN UR-MCNC: 50 MG/DL
ALP SERPL-CCNC: 89 U/L (ref 35–104)
ALT SERPL W P-5'-P-CCNC: 15 U/L (ref 10–35)
ANION GAP SERPL CALCULATED.3IONS-SCNC: 9 MMOL/L (ref 7–15)
APPEARANCE UR: CLEAR
AST SERPL W P-5'-P-CCNC: 13 U/L (ref 10–35)
BASOPHILS # BLD AUTO: 0.1 10E3/UL (ref 0–0.2)
BASOPHILS NFR BLD AUTO: 1 %
BILIRUB SERPL-MCNC: <0.2 MG/DL
BILIRUB UR QL STRIP: NEGATIVE
BUN SERPL-MCNC: 15.7 MG/DL (ref 8–23)
CALCIUM SERPL-MCNC: 9.3 MG/DL (ref 8.2–9.6)
CHLORIDE SERPL-SCNC: 102 MMOL/L (ref 98–107)
COLOR UR AUTO: YELLOW
CREAT SERPL-MCNC: 1.26 MG/DL (ref 0.51–0.95)
DEPRECATED HCO3 PLAS-SCNC: 27 MMOL/L (ref 22–29)
EOSINOPHIL # BLD AUTO: 0.3 10E3/UL (ref 0–0.7)
EOSINOPHIL NFR BLD AUTO: 4 %
ERYTHROCYTE [DISTWIDTH] IN BLOOD BY AUTOMATED COUNT: 14.5 % (ref 10–15)
GFR SERPL CREATININE-BSD FRML MDRD: 39 ML/MIN/1.73M2
GLUCOSE SERPL-MCNC: 151 MG/DL (ref 70–99)
GLUCOSE UR STRIP-MCNC: NEGATIVE MG/DL
HCT VFR BLD AUTO: 35.5 % (ref 35–47)
HGB BLD-MCNC: 11.8 G/DL (ref 11.7–15.7)
HGB UR QL STRIP: NEGATIVE
HOLD SPECIMEN: NORMAL
IMM GRANULOCYTES # BLD: 0.1 10E3/UL
IMM GRANULOCYTES NFR BLD: 1 %
KETONES UR STRIP-MCNC: NEGATIVE MG/DL
LEUKOCYTE ESTERASE UR QL STRIP: NEGATIVE
LYMPHOCYTES # BLD AUTO: 1.3 10E3/UL (ref 0.8–5.3)
LYMPHOCYTES NFR BLD AUTO: 19 %
MCH RBC QN AUTO: 31.3 PG (ref 26.5–33)
MCHC RBC AUTO-ENTMCNC: 33.2 G/DL (ref 31.5–36.5)
MCV RBC AUTO: 94 FL (ref 78–100)
MONOCYTES # BLD AUTO: 0.6 10E3/UL (ref 0–1.3)
MONOCYTES NFR BLD AUTO: 9 %
MUCOUS THREADS #/AREA URNS LPF: PRESENT /LPF
NEUTROPHILS # BLD AUTO: 4.5 10E3/UL (ref 1.6–8.3)
NEUTROPHILS NFR BLD AUTO: 66 %
NITRATE UR QL: NEGATIVE
NRBC # BLD AUTO: 0 10E3/UL
NRBC BLD AUTO-RTO: 0 /100
PH UR STRIP: 7 [PH] (ref 5–9)
PLATELET # BLD AUTO: 209 10E3/UL (ref 150–450)
POTASSIUM SERPL-SCNC: 4.6 MMOL/L (ref 3.4–5.3)
PROT SERPL-MCNC: 7 G/DL (ref 6.4–8.3)
RBC # BLD AUTO: 3.77 10E6/UL (ref 3.8–5.2)
RBC URINE: <1 /HPF
SODIUM SERPL-SCNC: 138 MMOL/L (ref 136–145)
SP GR UR STRIP: 1.02 (ref 1–1.03)
SQUAMOUS EPITHELIAL: 5 /HPF
UROBILINOGEN UR STRIP-MCNC: NORMAL MG/DL
WBC # BLD AUTO: 6.8 10E3/UL (ref 4–11)
WBC URINE: 1 /HPF

## 2023-02-15 PROCEDURE — 250N000013 HC RX MED GY IP 250 OP 250 PS 637: Performed by: STUDENT IN AN ORGANIZED HEALTH CARE EDUCATION/TRAINING PROGRAM

## 2023-02-15 PROCEDURE — 80053 COMPREHEN METABOLIC PANEL: CPT | Performed by: STUDENT IN AN ORGANIZED HEALTH CARE EDUCATION/TRAINING PROGRAM

## 2023-02-15 PROCEDURE — 81001 URINALYSIS AUTO W/SCOPE: CPT | Mod: ZL

## 2023-02-15 PROCEDURE — 258N000003 HC RX IP 258 OP 636: Performed by: STUDENT IN AN ORGANIZED HEALTH CARE EDUCATION/TRAINING PROGRAM

## 2023-02-15 PROCEDURE — 87040 BLOOD CULTURE FOR BACTERIA: CPT | Performed by: STUDENT IN AN ORGANIZED HEALTH CARE EDUCATION/TRAINING PROGRAM

## 2023-02-15 PROCEDURE — 99284 EMERGENCY DEPT VISIT MOD MDM: CPT | Performed by: STUDENT IN AN ORGANIZED HEALTH CARE EDUCATION/TRAINING PROGRAM

## 2023-02-15 PROCEDURE — 96374 THER/PROPH/DIAG INJ IV PUSH: CPT

## 2023-02-15 PROCEDURE — 99284 EMERGENCY DEPT VISIT MOD MDM: CPT | Mod: 25

## 2023-02-15 PROCEDURE — 36415 COLL VENOUS BLD VENIPUNCTURE: CPT | Performed by: STUDENT IN AN ORGANIZED HEALTH CARE EDUCATION/TRAINING PROGRAM

## 2023-02-15 PROCEDURE — 250N000009 HC RX 250: Performed by: STUDENT IN AN ORGANIZED HEALTH CARE EDUCATION/TRAINING PROGRAM

## 2023-02-15 PROCEDURE — 85025 COMPLETE CBC W/AUTO DIFF WBC: CPT | Performed by: STUDENT IN AN ORGANIZED HEALTH CARE EDUCATION/TRAINING PROGRAM

## 2023-02-15 PROCEDURE — 87086 URINE CULTURE/COLONY COUNT: CPT | Mod: ZL

## 2023-02-15 RX ORDER — METOPROLOL TARTRATE 1 MG/ML
5 INJECTION, SOLUTION INTRAVENOUS ONCE
Status: COMPLETED | OUTPATIENT
Start: 2023-02-15 | End: 2023-02-15

## 2023-02-15 RX ORDER — AMLODIPINE BESYLATE 5 MG/1
10 TABLET ORAL ONCE
Status: COMPLETED | OUTPATIENT
Start: 2023-02-15 | End: 2023-02-15

## 2023-02-15 RX ORDER — SODIUM CHLORIDE 9 MG/ML
INJECTION, SOLUTION INTRAVENOUS CONTINUOUS
Status: DISCONTINUED | OUTPATIENT
Start: 2023-02-15 | End: 2023-02-15 | Stop reason: HOSPADM

## 2023-02-15 RX ADMIN — METOPROLOL TARTRATE 5 MG: 5 INJECTION INTRAVENOUS at 17:39

## 2023-02-15 RX ADMIN — SODIUM CHLORIDE 500 ML: 9 INJECTION, SOLUTION INTRAVENOUS at 17:40

## 2023-02-15 ASSESSMENT — ACTIVITIES OF DAILY LIVING (ADL): ADLS_ACUITY_SCORE: 35

## 2023-02-15 NOTE — ED PROVIDER NOTES
"  History     Chief Complaint   Patient presents with     Flank Pain       Dunia Forbes is a 89 year old female who presents via EMS from [facility] for concern for flank pain with increased urinary frequency.  Patient reports some dysuria this morning but otherwise denies fever, chills, significant cough, dyspnea, any pain.  Per EMS patient has not taken her medications for approximately 1 week which includes antihypertensives.  Her SBP was in the 220s-230s for EMS.    Allergies   Allergen Reactions     Amitriptyline Itching     Atorvastatin Itching     Chondroitin Analogues [Chondroitin Sulfate A] Unknown     Knee pain     Lisinopril Cough     Pneumococcal Vaccine Unknown     \"Pneumonia shot\" allergy per outside documentation.     Pravachol [Pravastatin] Itching     Sulfa (Sulfonamide Antibiotics) [Sulfa Drugs] Unknown     Allergy to \"sulfa\" is per outside documentation.       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     Priority: Medium     Facial droop 11/01/2018     Priority: Medium     Facial abrasion, initial encounter 02/08/2018     Priority: Medium     Facial laceration, initial encounter 02/08/2018     Priority: Medium     Knee abrasion, right, initial encounter 02/08/2018     Priority: Medium     Right shoulder injury, initial encounter 02/08/2018     Priority: Medium     Other elevated white blood cell (WBC) count      Priority: Medium     Closed fracture of right hip with routine healing 06/09/2017     Priority: Medium     Elevated blood pressure      Priority: Medium     Glaucoma 05/27/2016     Priority: Medium     Macular degeneration 05/27/2016     Priority: Medium     Constipation 05/24/2016     Priority: Medium     Lt ICA 70% Stenosis 05/17/2016     Priority: Medium     Weakness 05/10/2016     Priority: Medium     Malignant hypertension 05/10/2016     Priority: Medium     Recurrent falls while walking 05/10/2016     Priority: " Medium     Exertional chest pain 11/09/2015     Priority: Medium     Anemia 12/04/2014     Priority: Medium     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     Priority: Medium     CAD (coronary artery disease) 11/30/2014     Priority: Medium     DM (diabetes mellitus) (H) 11/30/2014     Priority: Medium     Primary hypertension 11/30/2014     Priority: Medium     Dyslipidemia 11/30/2014     Priority: Medium     NSTEMI (non-ST elevated myocardial infarction) (H) 11/30/2014     Priority: Medium     Abnormal nuclear stress test 11/30/2014     Priority: Medium       Past Medical History:   Diagnosis Date     Arthritis      Diabetes mellitus (H)      GERD (gastroesophageal reflux disease)      Glaucoma      Hypercholesteremia      Hypertension      Macular degeneration        Past Surgical History:   Procedure Laterality Date     BYPASS GRAFT ARTERY CORONARY N/A 12/3/2014    Procedure: CORONARY ARTERY BYPASS X 3 WITH INTERNAL MAMMARY ARTERY, ENDOSCOPIC SAPHENOUS VEIN HARVEST ANESTHESIA TRANSESOPHAGEAL ECHOCARDIOGRAM ( Rm 4036 );  Surgeon: Nathalia Douglas MD;  Location: [facility];  Service:      CATARACT EXTRACTION       CATARACT IOL, RT/LT       ESOPHAGOSCOPY, GASTROSCOPY, DUODENOSCOPY (EGD), COMBINED  11/29/2012    Procedure: COMBINED ESOPHAGOSCOPY, GASTROSCOPY, DUODENOSCOPY (EGD), BIOPSY SINGLE OR MULTIPLE;  COMBINED ESOPHAGOSCOPY, GASTROSCOPY, DUODENOSCOPY (EGD) ;  Surgeon: Nick Chawla MD;  Location: [facility] PARTIAL HIP REPLACEMENT Right 6/10/2017    Procedure: RIGHT HIP HEMIARTHROPLASTY;  Surgeon: Fabian Cortez MD;  Location: [facility];  Service: Orthopedics       No family history on file.    Social History     Tobacco Use     Smoking status: Never     Smokeless tobacco: Never   Substance Use Topics     Alcohol use: No     Drug use: No       Medications:    acetaminophen (TYLENOL) 500 MG tablet  alum & mag hydroxide-simethicone (MAALOX) 200-200-20 MG/5ML SUSP suspension  amLODIPine (NORVASC)  "10 MG tablet  artificial tears OINT ophthalmic ointment  divalproex sodium delayed-release (DEPAKOTE) 250 MG DR tablet  divalproex sodium delayed-release (DEPAKOTE) 250 MG DR tablet  Ferrous Sulfate (IRON SUPPLEMENT PO)  GEMFIBROZIL PO  glycerin (ADULT) 2 g suppository  guaiFENesin (ROBITUSSIN) 20 mg/mL liquid  latanoprost (XALATAN) 0.005 % ophthalmic solution  loratadine (CLARITIN) 10 MG tablet  magnesium hydroxide (MILK OF MAGNESIA) 400 MG/5ML suspension  Metoprolol Tartrate 75 MG TABS  Neomycin-Bacitracin-Polymyxin (TRIPLE ANTIBIOTIC) 3.5-400-5000 OINT ointment  nitroGLYcerin (NITROSTAT) 0.4 MG sublingual tablet  OLANZapine zydis (ZYPREXA) 5 MG ODT  ranitidine (ZANTAC) 300 MG tablet  risperiDONE (RISPERDAL) 0.5 MG tablet  senna-docusate (SENOKOT-S/PERICOLACE) 8.6-50 MG tablet  sertraline (ZOLOFT) 50 MG tablet  Soft Lens Products (OPTI-FREE EXPRESS)  trolamine salicylate (ASPERCREME) 10 % external cream  Valsartan (DIOVAN PO)  Zinc Oxide 13 % CREA        Review of Systems: See HPI for pertinent negatives and positives. All other systems reviewed and found to be negative.    Physical Exam   BP (!) 219/116   Pulse 106   Temp 97.2  F (36.2  C) (Tympanic)   Resp 18   Ht 1.727 m (5' 8\")   Wt 82.6 kg (182 lb)   SpO2 99%   BMI 27.67 kg/m       General: awake, comfortable  HEENT: atraumatic  Respiratory: normal effort, clear to auscultation bilaterally  Cardiovascular: Mild tachycardia,, no murmurs  Abdomen: soft, nondistended, nontender  Extremities: no deformities, edema, or tenderness  : No CVA tenderness  Skin: warm, dry, no rashes  Neuro: alert, no focal deficits  Psych: appropriate mood and affect    ED Course      ED Course as of 02/15/23 1851   Wed Feb 15, 2023   1734 UA performed earlier today prior to ED encounter and appears uninfected.       Results for orders placed or performed during the hospital encounter of 02/15/23 (from the past 24 hour(s))   Extra Tube    Narrative    The following orders were " created for panel order Extra Tube.  Procedure                               Abnormality         Status                     ---------                               -----------         ------                     Extra Blue Top Tube[[id number]]                              Final result               Extra Red Top Tube[[id number]]                               Final result               Extra Green Top (Lithium...[[id number]]                                                 Extra Purple Top Tube[[id number]]                            Final result               Extra Green Top (Lithium...[[id number]]                      Final result                 Please view results for these tests on the individual orders.   Extra Blue Top Tube   Result Value Ref Range    Hold Specimen x    Extra Red Top Tube   Result Value Ref Range    Hold Specimen x    Extra Purple Top Tube   Result Value Ref Range    Hold Specimen x    Extra Green Top (Lithium Heparin) ON ICE   Result Value Ref Range    Hold Specimen x    CBC with platelets differential    Narrative    The following orders were created for panel order CBC with platelets differential.  Procedure                               Abnormality         Status                     ---------                               -----------         ------                     CBC with platelets and d...[[id number]]  Abnormal            Final result                 Please view results for these tests on the individual orders.   Comprehensive metabolic panel   Result Value Ref Range    Sodium 138 136 - 145 mmol/L    Potassium 4.6 3.4 - 5.3 mmol/L    Chloride 102 98 - 107 mmol/L    Carbon Dioxide (CO2) 27 22 - 29 mmol/L    Anion Gap 9 7 - 15 mmol/L    Urea Nitrogen 15.7 8.0 - 23.0 mg/dL    Creatinine 1.26 (H) 0.51 - 0.95 mg/dL    Calcium 9.3 8.2 - 9.6 mg/dL    Glucose 151 (H) 70 - 99 mg/dL    Alkaline Phosphatase 89 35 - 104 U/L    AST 13 10 - 35 U/L    ALT 15 10 - 35 U/L    Protein Total 7.0 6.4 - 8.3 g/dL     Albumin 4.1 3.5 - 5.2 g/dL    Bilirubin Total <0.2 <=1.2 mg/dL    GFR Estimate 39 (L) >60 mL/min/1.73m2   CBC with platelets and differential   Result Value Ref Range    WBC Count 6.8 4.0 - 11.0 10e3/uL    RBC Count 3.77 (L) 3.80 - 5.20 10e6/uL    Hemoglobin 11.8 11.7 - 15.7 g/dL    Hematocrit 35.5 35.0 - 47.0 %    MCV 94 78 - 100 fL    MCH 31.3 26.5 - 33.0 pg    MCHC 33.2 31.5 - 36.5 g/dL    RDW 14.5 10.0 - 15.0 %    Platelet Count 209 150 - 450 10e3/uL    % Neutrophils 66 %    % Lymphocytes 19 %    % Monocytes 9 %    % Eosinophils 4 %    % Basophils 1 %    % Immature Granulocytes 1 %    NRBCs per 100 WBC 0 <1 /100    Absolute Neutrophils 4.5 1.6 - 8.3 10e3/uL    Absolute Lymphocytes 1.3 0.8 - 5.3 10e3/uL    Absolute Monocytes 0.6 0.0 - 1.3 10e3/uL    Absolute Eosinophils 0.3 0.0 - 0.7 10e3/uL    Absolute Basophils 0.1 0.0 - 0.2 10e3/uL    Absolute Immature Granulocytes 0.1 <=0.4 10e3/uL    Absolute NRBCs 0.0 10e3/uL       Medications   0.9% sodium chloride BOLUS (0 mLs Intravenous Stopped 2/15/23 1849)     Followed by   sodium chloride 0.9% infusion (has no administration in time range)   amLODIPine (NORVASC) tablet 10 mg (10 mg Oral Not Given 2/15/23 1739)   metoprolol (LOPRESSOR) injection 5 mg (5 mg Intravenous Given 2/15/23 1739)       Assessments & Plan (with Medical Decision Making)     I have reviewed the nursing notes.    89 year old female evaluated for concern for UTI by nursing home facility with increased urination frequency.  Patient does report some dysuria this morning.  UA collected today unremarkable and not appearing infected.  CBC and CMP unremarkable with renal functional at prior baseline.  Blood pressure was quite high in the 200s and was treated with a dose of Lopressor with some improvement.  Reassurance provided regarding no UTI and patient discharged home.    I have reviewed the findings, diagnosis, plan, and need for any follow up with the patient.    Patient instructions:   No UTI  per urine test today. Other labs reassuring without concerning findings.    Blood pressure was quite high and this was treated with IV medication as patient did not want to take oral medications.  If possible recommend restarting home oral blood pressure medications if patient will tolerate.    New Prescriptions    No medications on file       Final diagnoses:   Concern about disease without diagnosis       2/15/2023   [facility] AND [facility]     Jose Antonio Hernandez MD  02/15/23 5461

## 2023-02-15 NOTE — ED TRIAGE NOTES
D/c plan: met with patient and her son and again during IDR's  Patient is recieveing blood transfusion during IDR's. Patient states she would like to go home with [facility] however, she has not ruled out rehab. States she will do what ever Pt recommends. Cm waiting on Pt recommendations and will follow back up with patient. Care Management Interventions  PCP Verified by CM:  Yes  Transition of Care Consult (CM Consult): Discharge Planning (rehab versus hhhc)  Current Support Network: Relative's Home  Confirm Follow Up Transport: Family  Plan discussed with Pt/Family/Caregiver: Yes  Freedom of Choice Offered: Yes  Discharge Location  Discharge Placement: Other: (patient waiting to work with PT and recommendations as to d/c plan) Pt here by meds 1 into [facility], pt comes from [facility], staff there is concerned because pt has had frequent urination today and hx of UTI, pt has been refusing her medications and has elevated BP at this time, pt is confused and denies any complaints     Triage Assessment     Row Name 02/15/23 7253       Triage Assessment (Adult)    Airway WDL WDL       Respiratory WDL    Respiratory WDL WDL       Skin Circulation/Temperature WDL    Skin Circulation/Temperature WDL WDL       Cardiac WDL    Cardiac WDL WDL       Peripheral/Neurovascular WDL    Peripheral Neurovascular WDL WDL       Cognitive/Neuro/Behavioral WDL    Cognitive/Neuro/Behavioral WDL WDL

## 2023-02-16 NOTE — DISCHARGE INSTRUCTIONS
No UTI per urine test today. Other labs reassuring without concerning findings.    Blood pressure was quite high and this was treated with IV medication as patient did not want to take oral medications.  If possible recommend restarting home oral blood pressure medications if patient will tolerate.

## 2023-02-17 LAB — BACTERIA UR CULT: NORMAL

## 2023-02-20 LAB
BACTERIA BLD CULT: NO GROWTH
BACTERIA BLD CULT: NO GROWTH

## 2023-04-06 NOTE — PROGRESS NOTES
:    Patient is from [facility] living.    Patient is going to be discharged back.    Message left to nursing staff at [facility].    LEONCIO Santacruz on 4/6/2023 at 9:26 AM

## 2023-04-06 NOTE — ED PROVIDER NOTES
"  History     Chief Complaint   Patient presents with     Fall       Dunia Forbes is a 89 year old female who presents via EMS from [facility] with unwitnessed fall and complains of right rib pain by nursing home staff.  Apparently patient was found down this morning with table next to her bed knocked over with some complaints of right rib pain.  Patient denies any pain.  She is intermittently mildly combative and not cooperative with exam, likely due to advancing dementia. Denies any pain. No further history is able to be obtained.     Allergies   Allergen Reactions     Amitriptyline Itching     Atorvastatin Itching     Chondroitin Analogues [Chondroitin Sulfate A] Unknown     Knee pain     Lisinopril Cough     Pneumococcal Vaccine Unknown     \"Pneumonia shot\" allergy per outside documentation.     Pravachol [Pravastatin] Itching     Sulfa (Sulfonamide Antibiotics) [Sulfa Drugs] Unknown     Allergy to \"sulfa\" is per outside documentation.       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     Priority: Medium     Facial droop 11/01/2018     Priority: Medium     Facial abrasion, initial encounter 02/08/2018     Priority: Medium     Facial laceration, initial encounter 02/08/2018     Priority: Medium     Knee abrasion, right, initial encounter 02/08/2018     Priority: Medium     Right shoulder injury, initial encounter 02/08/2018     Priority: Medium     Other elevated white blood cell (WBC) count      Priority: Medium     Closed fracture of right hip with routine healing 06/09/2017     Priority: Medium     Elevated blood pressure      Priority: Medium     Glaucoma 05/27/2016     Priority: Medium     Macular degeneration 05/27/2016     Priority: Medium     Constipation 05/24/2016     Priority: Medium     Lt ICA 70% Stenosis 05/17/2016     Priority: Medium     Weakness 05/10/2016     Priority: Medium     Malignant hypertension 05/10/2016     Priority: Medium     " Recurrent falls while walking 05/10/2016     Priority: Medium     Exertional chest pain 11/09/2015     Priority: Medium     Anemia 12/04/2014     Priority: Medium     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     Priority: Medium     CAD (coronary artery disease) 11/30/2014     Priority: Medium     DM (diabetes mellitus) (H) 11/30/2014     Priority: Medium     Primary hypertension 11/30/2014     Priority: Medium     Dyslipidemia 11/30/2014     Priority: Medium     NSTEMI (non-ST elevated myocardial infarction) (H) 11/30/2014     Priority: Medium     Abnormal nuclear stress test 11/30/2014     Priority: Medium       Past Medical History:   Diagnosis Date     Arthritis      Diabetes mellitus (H)      GERD (gastroesophageal reflux disease)      Glaucoma      Hypercholesteremia      Hypertension      Macular degeneration        Past Surgical History:   Procedure Laterality Date     BYPASS GRAFT ARTERY CORONARY N/A 12/3/2014    Procedure: CORONARY ARTERY BYPASS X 3 WITH INTERNAL MAMMARY ARTERY, ENDOSCOPIC SAPHENOUS VEIN HARVEST ANESTHESIA TRANSESOPHAGEAL ECHOCARDIOGRAM ( Rm [id number] );  Surgeon: Nathalia Douglas MD;  Location: [facility];  Service:      CATARACT EXTRACTION       CATARACT IOL, RT/LT       ESOPHAGOSCOPY, GASTROSCOPY, DUODENOSCOPY (EGD), COMBINED  11/29/2012    Procedure: COMBINED ESOPHAGOSCOPY, GASTROSCOPY, DUODENOSCOPY (EGD), BIOPSY SINGLE OR MULTIPLE;  COMBINED ESOPHAGOSCOPY, GASTROSCOPY, DUODENOSCOPY (EGD) ;  Surgeon: Nick Chawla MD;  Location: [facility] PARTIAL HIP REPLACEMENT Right 6/10/2017    Procedure: RIGHT HIP HEMIARTHROPLASTY;  Surgeon: Fabian Cortez MD;  Location: [facility];  Service: Orthopedics       No family history on file.    Social History     Tobacco Use     Smoking status: Never     Smokeless tobacco: Never   Substance Use Topics     Alcohol use: No     Drug use: No       Medications:    acetaminophen (TYLENOL) 500 MG tablet  alum & mag hydroxide-simethicone (MAALOX)  "200-200-20 MG/5ML SUSP suspension  amLODIPine (NORVASC) 10 MG tablet  artificial tears OINT ophthalmic ointment  divalproex sodium delayed-release (DEPAKOTE) 250 MG DR tablet  divalproex sodium delayed-release (DEPAKOTE) 250 MG DR tablet  Ferrous Sulfate (IRON SUPPLEMENT PO)  GEMFIBROZIL PO  glycerin (ADULT) 2 g suppository  guaiFENesin (ROBITUSSIN) 20 mg/mL liquid  latanoprost (XALATAN) 0.005 % ophthalmic solution  loratadine (CLARITIN) 10 MG tablet  magnesium hydroxide (MILK OF MAGNESIA) 400 MG/5ML suspension  Metoprolol Tartrate 75 MG TABS  Neomycin-Bacitracin-Polymyxin (TRIPLE ANTIBIOTIC) 3.5-400-5000 OINT ointment  nitroGLYcerin (NITROSTAT) 0.4 MG sublingual tablet  OLANZapine zydis (ZYPREXA) 5 MG ODT  ranitidine (ZANTAC) 300 MG tablet  risperiDONE (RISPERDAL) 0.5 MG tablet  senna-docusate (SENOKOT-S/PERICOLACE) 8.6-50 MG tablet  sertraline (ZOLOFT) 50 MG tablet  Soft [employer]ens Products (OPTI-FREE EXPRESS)  trolamine salicylate (ASPERCREME) 10 % external cream  Valsartan (DIOVAN PO)  Zinc Oxide 13 % CREA        Review of Systems: See HPI for pertinent negatives and positives. All other systems reviewed and found to be negative.    Physical Exam   BP (!) 213/117   Pulse 85   Temp 98.9  F (37.2  C)   Resp 16   Ht 1.727 m (5' 8\")   Wt 82.6 kg (182 lb)   SpO2 96%   BMI 27.67 kg/m       General: awake, comfortable  HEENT: atraumatic  Respiratory: normal effort, clear to auscultation bilaterally  Cardiovascular: regular rate and rhythm, no murmurs  Chest wall: nontender bilaterally with exam somewhat limited by patient cooperation but no signs of discomfort  Abdomen: soft, nondistended, nontender  Extremities: no deformities, edema, or tenderness  Back: not able to be assessed due to patient intolerance  Skin: warm, dry, no rashes  Neuro: alert, no focal deficits  Psych: appropriate mood and affect, mildly combative and agitated with physical exam attempt    ED Course      ED Course as of 04/06/23 1045   Thu Apr " 06, 2023   0939 EKG: NSR, no evidence of acute ischemia with no ST abnormalities, LBBB, I/II/V4-6 TWI. RBBB stable vs prior. Qtc prolonged 505 but decreased from prior 515.   1040 Renal function at baseline.       Results for orders placed or performed during the hospital encounter of 04/06/23 (from the past 24 hour(s))   CBC with platelets differential    Narrative    The following orders were created for panel order CBC with platelets differential.  Procedure                               Abnormality         Status                     ---------                               -----------         ------                     CBC with platelets and d...[[id number]]                      Final result                 Please view results for these tests on the individual orders.   Basic metabolic panel   Result Value Ref Range    Sodium 139 136 - 145 mmol/L    Potassium 4.4 3.4 - 5.3 mmol/L    Chloride 102 98 - 107 mmol/L    Carbon Dioxide (CO2) 26 22 - 29 mmol/L    Anion Gap 11 7 - 15 mmol/L    Urea Nitrogen 16.0 8.0 - 23.0 mg/dL    Creatinine 1.15 (H) 0.51 - 0.95 mg/dL    Calcium 9.2 8.2 - 9.6 mg/dL    Glucose 128 (H) 70 - 99 mg/dL    GFR Estimate 44 (L) >60 mL/min/1.73m2   Extra Tube    Narrative    The following orders were created for panel order Extra Tube.  Procedure                               Abnormality         Status                     ---------                               -----------         ------                     Extra Blue Top Tube[[id number]]                              Final result               Extra Serum Separator Tu...[[id number]]                      Final result               Extra Green Top (Lithium...[[id number]]                      Final result                 Please view results for these tests on the individual orders.   Extra Blue Top Tube   Result Value Ref Range    Hold Specimen JIC    Extra Serum Separator Tube (SST)   Result Value Ref Range    Hold Specimen JIC    Extra Green Top  (Lithium Heparin) ON ICE   Result Value Ref Range    Hold Specimen JIC    Extra Tube    Narrative    The following orders were created for panel order Extra Tube.  Procedure                               Abnormality         Status                     ---------                               -----------         ------                     Extra Blood Culture Bottle[[id number]]                       Final result                 Please view results for these tests on the individual orders.   Extra Blood Culture Bottle   Result Value Ref Range    Hold Specimen jic    CBC with platelets and differential   Result Value Ref Range    WBC Count 8.6 4.0 - 11.0 10e3/uL    RBC Count 3.87 3.80 - 5.20 10e6/uL    Hemoglobin 11.8 11.7 - 15.7 g/dL    Hematocrit 35.7 35.0 - 47.0 %    MCV 92 78 - 100 fL    MCH 30.5 26.5 - 33.0 pg    MCHC 33.1 31.5 - 36.5 g/dL    RDW 14.1 10.0 - 15.0 %    Platelet Count 202 150 - 450 10e3/uL    % Neutrophils 80 %    % Lymphocytes 9 %    % Monocytes 8 %    % Eosinophils 2 %    % Basophils 0 %    % Immature Granulocytes 1 %    NRBCs per 100 WBC 0 <1 /100    Absolute Neutrophils 6.9 1.6 - 8.3 10e3/uL    Absolute Lymphocytes 0.8 0.8 - 5.3 10e3/uL    Absolute Monocytes 0.7 0.0 - 1.3 10e3/uL    Absolute Eosinophils 0.1 0.0 - 0.7 10e3/uL    Absolute Basophils 0.0 0.0 - 0.2 10e3/uL    Absolute Immature Granulocytes 0.1 <=0.4 10e3/uL    Absolute NRBCs 0.0 10e3/uL   Magnesium   Result Value Ref Range    Magnesium 1.9 1.7 - 2.3 mg/dL   XR Chest 1 View    Narrative    Procedure:XR CHEST 1 VIEW    Clinical history:Female, 89 years, rib pain (R>L)    Technique: Single view was obtained.    Comparison: 11/1/2018    Findings: The cardiac silhouette is normal. The pulmonary vasculature  is normal.    The lungs are clear. Bony structures again demonstrate postoperative  changes of the sternum. No apparent rib fracture or pneumothorax.      Impression    Impression:   No acute abnormality. No evidence of acute or active  cardiopulmonary  disease.    OLEG ROUSE MD         SYSTEM ID:  [id number]   XR Thoracic Spine 2 Views    Narrative    Exam: XR THORACIC SPINE 2 VIEWS    Exam reason: fall with chest pain concern for vertebral fracture    Technique: AP and lateral views were obtained.    Comparison: None.    Findings:     Questionable mild superior endplate compression fracture of L1.    Normal alignment.     There are scattered degenerative changes of the thoracic spine.    Prior median sternotomy. Calcified atherosclerosis of the aorta is  noted.      Impression    Impression:    Questionable mild superior end plate compression fracture of L1.    ELLIE MAN MD         SYSTEM ID:  [id number]   XR Lumbar Spine 2/3 Views    Narrative    Exam: XR LUMBAR SPINE 2/3 VIEWS    Exam Reason: recent fall, pain, rule out fracture    Comparison: None.    Findings:   AP and lateral radiographic views were obtained.     Probable mild age-indeterminate superior endplate compression  fractures of L1 and L4    There is trace anterolisthesis of L3 on L4 and of L4 on L5.     There are multilevel degenerative changes of the lumbar spine  including disc space narrowing, endplate osteophytes, and multilevel  facet arthropathy. There are mild to moderate degenerative changes of  the SI joints.     There is calcified atherosclerosis of the abdominal aorta. Prior right  hip arthroplasty.      Impression    IMPRESSION:      Probable mild age-indeterminate superior endplate compression  fractures of L1 and L4. An MRI of the lumbar spine without contrast  could be considered for further evaluation.    ELLIE MAN MD         SYSTEM ID:  [id number]       Medications - No data to display    Assessments & Plan (with Medical Decision Making)     I have reviewed the nursing notes.    89 year old female evaluated for unwitnessed fall and reports of rib pain. Evaluation remarkable for patient who is not completely tolerating examination due to periodic  agitation and mild combativeness.  EKG and labs without acute concerning findings.  Chest x-ray and thoracic vertebrae without acute findings.  Two-view chest x-ray unable to be completed due to patient intolerance being combative with x-ray tech.  Patient was ambulated with no signs of discomfort which is reassuring for unlikely serious bone fracture.  Discharging home with attached instructions on diagnosis including ED return precautions.    I have reviewed the findings, diagnosis, plan, and need for any follow up with the patient.    Patient instructions:   Reassuring evaluation including chest and thoracic back imaging for unlikely serious bone break.  Patient was ambulated without any signs of pain.  Labs and EKG were also unremarkable.    Please review attached instructions including reasons to return to the emergency department, including severe intolerable pain.    New Prescriptions    No medications on file       Final diagnoses:   Fall, initial encounter       4/6/2023   [facility] AND [facility]     Jose Antonio Hernandez MD  04/06/23 [id number]

## 2023-04-06 NOTE — ED TRIAGE NOTES
Pt arrives via EMS. Pt fell last night. Pt c/o rib pain. Pt has not taken meds for the past several days.    Jeanne Huizar RN on 4/6/2023 at 8:58 AM       Triage Assessment     Row Name 04/06/23 0856       Triage Assessment (Adult)    Airway WDL WDL       Respiratory WDL    Respiratory WDL WDL       Skin Circulation/Temperature WDL    Skin Circulation/Temperature WDL X;all    Skin Circulation --  dry    Skin Temperature warm       Peripheral/Neurovascular WDL    Peripheral Neurovascular WDL WDL

## 2023-04-06 NOTE — DISCHARGE INSTRUCTIONS
Reassuring evaluation including chest and thoracic back imaging for unlikely serious bone break.  Patient was ambulated without any signs of pain.  Labs and EKG were also unremarkable.    Please review attached instructions including reasons to return to the emergency department, including severe intolerable pain.

## 2023-04-06 NOTE — ED NOTES
Pt. Was able to sit up with minimal assist. Pt called writer stupid and would not stand up, layed back down and covered pt. Up.

## 2023-04-23 NOTE — ED NOTES
Per provider, cancel stat doc at [facility] for placement, pt to return back home

## 2023-04-23 NOTE — ED PROVIDER NOTES
"  History     Chief Complaint   Patient presents with     Confusion     Chest Pain     The history is provided by the patient. No  was used.     Dunia Forbes is a 89 year old female with coronary artery disease, DM, retention, dyslipidemia, S/P CABG x3, exertional chest pain, cerebral infarction, who presents to the emergency room via EMS from [facility]..  Patient was brought to the ER for evaluation of chest pain.  It was reported that patient developed chest pain earlier today sometime around 1920.  There was also concern that patient was becoming increasingly confused and had increased urinary frequency earlier today as well.  The facility obtained a UA and came back negative for a UTI.  Patient denies having any active symptoms such as chest pain, shortness of breath, numbness tingling, weakness, dizziness, or  headaches.    Allergies:  Allergies   Allergen Reactions     Amitriptyline Itching     Atorvastatin Itching     Chondroitin Analogues [Chondroitin Sulfate A] Unknown     Knee pain     Lisinopril Cough     Pneumococcal Vaccine Unknown     \"Pneumonia shot\" allergy per outside documentation.     Pravachol [Pravastatin] Itching     Sulfa (Sulfonamide Antibiotics) [Sulfa Drugs] Unknown     Allergy to \"sulfa\" is per outside documentation.       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     Priority: Medium     Facial droop 11/01/2018     Priority: Medium     Facial abrasion, initial encounter 02/08/2018     Priority: Medium     Facial laceration, initial encounter 02/08/2018     Priority: Medium     Knee abrasion, right, initial encounter 02/08/2018     Priority: Medium     Right shoulder injury, initial encounter 02/08/2018     Priority: Medium     Other elevated white blood cell (WBC) count      Priority: Medium     Closed fracture of right hip with routine healing 06/09/2017     Priority: " Medium     Elevated blood pressure      Priority: Medium     Glaucoma 05/27/2016     Priority: Medium     Macular degeneration 05/27/2016     Priority: Medium     Constipation 05/24/2016     Priority: Medium     Lt ICA 70% Stenosis 05/17/2016     Priority: Medium     Weakness 05/10/2016     Priority: Medium     Malignant hypertension 05/10/2016     Priority: Medium     Recurrent falls while walking 05/10/2016     Priority: Medium     Exertional chest pain 11/09/2015     Priority: Medium     Anemia 12/04/2014     Priority: Medium     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     Priority: Medium     CAD (coronary artery disease) 11/30/2014     Priority: Medium     DM (diabetes mellitus) (H) 11/30/2014     Priority: Medium     Primary hypertension 11/30/2014     Priority: Medium     Dyslipidemia 11/30/2014     Priority: Medium     NSTEMI (non-ST elevated myocardial infarction) (H) 11/30/2014     Priority: Medium     Abnormal nuclear stress test 11/30/2014     Priority: Medium        Past Medical History:    Past Medical History:   Diagnosis Date     Arthritis      Diabetes mellitus (H)      GERD (gastroesophageal reflux disease)      Glaucoma      Hypercholesteremia      Hypertension      Macular degeneration        Past Surgical History:    Past Surgical History:   Procedure Laterality Date     BYPASS GRAFT ARTERY CORONARY N/A 12/3/2014    Procedure: CORONARY ARTERY BYPASS X 3 WITH INTERNAL MAMMARY ARTERY, ENDOSCOPIC SAPHENOUS VEIN HARVEST ANESTHESIA TRANSESOPHAGEAL ECHOCARDIOGRAM ( Rm [id number] );  Surgeon: Nathalia Douglas MD;  Location: [facility];  Service:      CATARACT EXTRACTION       CATARACT IOL, RT/LT       ESOPHAGOSCOPY, GASTROSCOPY, DUODENOSCOPY (EGD), COMBINED  11/29/2012    Procedure: COMBINED ESOPHAGOSCOPY, GASTROSCOPY, DUODENOSCOPY (EGD), BIOPSY SINGLE OR MULTIPLE;  COMBINED ESOPHAGOSCOPY, GASTROSCOPY, DUODENOSCOPY (EGD) ;  Surgeon: Nick Chawla MD;  Location: [facility] PARTIAL HIP REPLACEMENT  Right 6/10/2017    Procedure: RIGHT HIP HEMIARTHROPLASTY;  Surgeon: Fabian Cortez MD;  Location: [facility];  Service: Orthopedics       Family History:    History reviewed. No pertinent family history.    Social History:  Marital Status:  Single [1]  Social History     Tobacco Use     Smoking status: Never     Smokeless tobacco: Never   Substance Use Topics     Alcohol use: No     Drug use: No        Medications:    acetaminophen (TYLENOL) 500 MG tablet  alum & mag hydroxide-simethicone (MAALOX) 200-200-20 MG/5ML SUSP suspension  amLODIPine (NORVASC) 10 MG tablet  artificial tears OINT ophthalmic ointment  divalproex sodium delayed-release (DEPAKOTE) 250 MG DR tablet  divalproex sodium delayed-release (DEPAKOTE) 250 MG DR tablet  Ferrous Sulfate (IRON SUPPLEMENT PO)  GEMFIBROZIL PO  glycerin (ADULT) 2 g suppository  guaiFENesin (ROBITUSSIN) 20 mg/mL liquid  latanoprost (XALATAN) 0.005 % ophthalmic solution  loratadine (CLARITIN) 10 MG tablet  magnesium hydroxide (MILK OF MAGNESIA) 400 MG/5ML suspension  Metoprolol Tartrate 75 MG TABS  Neomycin-Bacitracin-Polymyxin (TRIPLE ANTIBIOTIC) 3.5-400-5000 OINT ointment  nitroGLYcerin (NITROSTAT) 0.4 MG sublingual tablet  OLANZapine zydis (ZYPREXA) 5 MG ODT  ranitidine (ZANTAC) 300 MG tablet  risperiDONE (RISPERDAL) 0.5 MG tablet  senna-docusate (SENOKOT-S/PERICOLACE) 8.6-50 MG tablet  sertraline (ZOLOFT) 50 MG tablet  [employer] Products (OPTI-FREE EXPRESS)  trolamine salicylate (ASPERCREME) 10 % external cream  Valsartan (DIOVAN PO)  Zinc Oxide 13 % CREA          Review of Systems   Constitutional: Negative.    HENT: Negative.    Respiratory: Negative.    Cardiovascular: Positive for chest pain. Negative for palpitations and leg swelling.   Gastrointestinal: Negative.    Genitourinary: Negative.    Musculoskeletal: Negative.    Skin: Negative.    Allergic/Immunologic: Negative.    Neurological: Negative.    Hematological: Negative.    Psychiatric/Behavioral:  Negative.        Physical Exam   BP: (!) 187/102  Pulse: 88  Temp: 98.4  F (36.9  C)  Resp: 16  SpO2: 98 %      Physical Exam  Vitals and nursing note reviewed.   Constitutional:       Appearance: She is well-developed and normal weight.   Cardiovascular:      Rate and Rhythm: Normal rate and regular rhythm.      Heart sounds: Normal heart sounds. Heart sounds not distant. No murmur heard.      No systolic murmur is present.      No diastolic murmur is present.     No friction rub. No gallop. No S3 or S4 sounds.   Pulmonary:      Effort: Pulmonary effort is normal. No tachypnea, accessory muscle usage or respiratory distress.      Breath sounds: Normal breath sounds. No stridor. No decreased breath sounds, wheezing, rhonchi or rales.   Abdominal:      General: Bowel sounds are normal.      Palpations: Abdomen is soft. There is no splenomegaly or mass.      Tenderness: There is no abdominal tenderness. There is no guarding or rebound.   Musculoskeletal:         General: Normal range of motion.      Right lower leg: No tenderness. No edema.      Left lower leg: No tenderness. No edema.   Skin:     General: Skin is warm.      Capillary Refill: Capillary refill takes less than 2 seconds.      Findings: No erythema or rash.   Neurological:      General: No focal deficit present.      Mental Status: She is alert and oriented to person, place, and time.   Psychiatric:         Mood and Affect: Mood normal.         Behavior: Behavior normal.         ED Course     Dunia Forbes is a 89 year old female with coronary artery disease, DM, retention, dyslipidemia, S/P CABG x3, exertional chest pain, cerebral infarction, who presents to the emergency room via EMS from [facility]..  Patient was brought to the ER for evaluation of chest pain.  It was reported that patient developed chest pain earlier today sometime around 1920.  There was also concern that patient was becoming increasingly confused and had  "increased urinary frequency earlier today as well.  The facility obtained a UA and came back negative for a UTI.  Patient denies having any active symptoms such as chest pain, shortness of breath, numbness tingling, weakness, dizziness, or  Headaches. BP (!) 199/85   Pulse 94   Temp 98.4  F (36.9  C)   Resp 10   SpO2 96%  Physical exam was reassuring.    Patient continued to deny having any active symptoms.    CBC unremarkable, potassium 4.4, sodium 138, creatinine 1.04 (stable), GFR 50 stable,  Troponin 89 with repeat troponin 86, BNP 3938, UA showed protein but stable. Chest xray was negative for cardiopulmonary disease.  EKG showed normal sinus rhythm, possible left atrial enlargement, left axis deviation, but no ST elevation or depression noted.  Overall unchanged from 4/6/2023.    Heart score 7 placing patient at a high risk category.     Echo from 2018 showed mild aortic stenosis and moderate mitral stenosis    10:25 PM  I spoke with hospitalist at [place].  Given patient's previous cardiac history and elevated troponins he recommended for the patient to be admitted for observation.  The whole purpose for observation would be to continue to monitor and stabilize her blood pressure.  Patient was accepted at [place].  Recommendation to give aspirin to the patient.    Aspirin was ordered but patient declined aspirin or any blood pressure medications.      10:33 PM  I discussed all findings with the patient.  I explained that the recommendation would be for her to be admitted for further monitoring of her blood pressure and to monitor for any symptoms.  Patient declined stating that she did not want to be admitted.  Stated that she was feeling well without having any symptoms.  Patient stated \" I am 89 years old.  How long do you think I have left?  I am perfectly fine.  I do not want to stay here.  I do not want to go to [place]. I want to go home.\"     Patient had made previous comments that made me " question if patient was fully coherent.  At one point she was saying that she needed to go to [employer] and she was going to be late for [employer].  Therefore I decided to proceed with consulting with the next of kin, val Silver.    11:00PM  I spoke with Nadeem and the patient at bedside.  I had a detailed discussion with both. I explained that giving the cardiac history and the lab findings the patient is at a very high risk for a myocardial infarction.  I explained that the recommendation would be for the patient to be admitted at the hospital.  Unfortunately we are on divert and we do not have the cardiologist on-call.  Explained that the patient has been accepted at [facility] in [place].  I wanted to hear yaninikki input regarding patient's decision.  Nadeem explained that he would like to proceed with Dunia's wishes of going home.  I explained that I cannot force the patient to make any decisions.  But my recommendation still stands that she needs to be admitted.  However I understand and respect Dunia's wishes.  Therefore we through shared decision making we made the decision to discharge the patient home.    I explicitly explained that if Dunia redevelops the chest pain, shortness of breath, or any other symptoms that she should send to the emergency room for reevaluation if she wishes.  Patient verbalized understanding and comfortable with discharge plan.  All questions were answered to the best my knowledge.  Patient was comfortable discharging home.    Results for orders placed or performed during the hospital encounter of 04/22/23 (from the past 24 hour(s))   [place] Draw *Canceled*    Narrative    The following orders were created for panel order [place] Draw.  Procedure                               Abnormality         Status                     ---------                               -----------         ------                       Please view results for these tests on the individual orders.   CBC with platelets  differential    Narrative    The following orders were created for panel order CBC with platelets differential.  Procedure                               Abnormality         Status                     ---------                               -----------         ------                     CBC with platelets and d...[[id number]]                      Final result                 Please view results for these tests on the individual orders.   Basic metabolic panel   Result Value Ref Range    Sodium 138 136 - 145 mmol/L    Potassium 4.4 3.4 - 5.3 mmol/L    Chloride 100 98 - 107 mmol/L    Carbon Dioxide (CO2) 28 22 - 29 mmol/L    Anion Gap 10 7 - 15 mmol/L    Urea Nitrogen 10.2 8.0 - 23.0 mg/dL    Creatinine 1.04 (H) 0.51 - 0.95 mg/dL    Calcium 8.9 8.2 - 9.6 mg/dL    Glucose 126 (H) 70 - 99 mg/dL    GFR Estimate 50 (L) >60 mL/min/1.73m2   Troponin T, High Sensitivity   Result Value Ref Range    Troponin T, High Sensitivity 89 (H) <=14 ng/L   Magnesium   Result Value Ref Range    Magnesium 2.0 1.7 - 2.3 mg/dL   CBC with platelets and differential   Result Value Ref Range    WBC Count 8.0 4.0 - 11.0 10e3/uL    RBC Count 3.91 3.80 - 5.20 10e6/uL    Hemoglobin 11.9 11.7 - 15.7 g/dL    Hematocrit 35.8 35.0 - 47.0 %    MCV 92 78 - 100 fL    MCH 30.4 26.5 - 33.0 pg    MCHC 33.2 31.5 - 36.5 g/dL    RDW 14.1 10.0 - 15.0 %    Platelet Count 249 150 - 450 10e3/uL    % Neutrophils 68 %    % Lymphocytes 15 %    % Monocytes 10 %    % Eosinophils 5 %    % Basophils 1 %    % Immature Granulocytes 1 %    NRBCs per 100 WBC 0 <1 /100    Absolute Neutrophils 5.5 1.6 - 8.3 10e3/uL    Absolute Lymphocytes 1.2 0.8 - 5.3 10e3/uL    Absolute Monocytes 0.8 0.0 - 1.3 10e3/uL    Absolute Eosinophils 0.4 0.0 - 0.7 10e3/uL    Absolute Basophils 0.1 0.0 - 0.2 10e3/uL    Absolute Immature Granulocytes 0.1 <=0.4 10e3/uL    Absolute NRBCs 0.0 10e3/uL   Extra Tube    Narrative    The following orders were created for panel order Extra Tube.  Procedure                                Abnormality         Status                     ---------                               -----------         ------                     Extra Blue Top Tube[[id number]]                              Final result               Extra Red Top Tube[[id number]]                               Final result                 Please view results for these tests on the individual orders.   Extra Blue Top Tube   Result Value Ref Range    Hold Specimen JIC    Extra Red Top Tube   Result Value Ref Range    Hold Specimen JIC    NT pro BNP   Result Value Ref Range    N terminal Pro BNP Inpatient 3,938 (H) 0 - 1,800 pg/mL   UA reflex to Microscopic   Result Value Ref Range    Color Urine Light Yellow Colorless, Straw, Light Yellow, Yellow    Appearance Urine Clear Clear    Glucose Urine Negative Negative mg/dL    Bilirubin Urine Negative Negative    Ketones Urine Negative Negative mg/dL    Specific Gravity Urine 1.011 1.000 - 1.030    Blood Urine Negative Negative    pH Urine 7.5 5.0 - 9.0    Protein Albumin Urine 30 (A) Negative mg/dL    Urobilinogen Urine Normal Normal, 2.0 mg/dL    Nitrite Urine Negative Negative    Leukocyte Esterase Urine Negative Negative    RBC Urine 1 <=2 /HPF    WBC Urine 1 <=5 /HPF   XR Chest Port 1 View    Narrative    PROCEDURE INFORMATION:   Exam: XR Chest   Exam date and time: 4/22/2023 9:33 PM   Age: 89 years old   Clinical indication: Chest wall pain; Additional info: Chest pain     TECHNIQUE:   Imaging protocol: Radiologic exam of the chest.   Views: 1 view.     COMPARISON:   CR XR CHEST 2 VIEWS 4/6/2023 9:29 AM     FINDINGS:   Lungs: Unremarkable. No consolidation.   Pleural spaces: Unremarkable. No pleural effusion. No pneumothorax.   Heart/Mediastinum: Unremarkable. No cardiomegaly.   Bones/joints: Median sternotomy noted.       Impression    IMPRESSION:   No radiographic evidence of active cardiopulmonary disease.    Current examination is unchanged from prior examination.    THIS  DOCUMENT HAS BEEN ELECTRONICALLY SIGNED BY NOÉ PINTO MD   Troponin T, High Sensitivity   Result Value Ref Range    Troponin T, High Sensitivity 86 (H) <=14 ng/L       Medications   aspirin (ASA) tablet 325 mg (has no administration in time range)       Assessments & Plan (with Medical Decision Making)     I have reviewed the nursing notes.    I have reviewed the findings, diagnosis, plan and need for follow up with the patient.  Charge home    Medical Decision Making  The patient's presentation was of high complexity (a chronic illness severe exacerbation, progression, or side effect of treatment).    The patient's evaluation involved:  an assessment requiring an independent historian (see separate area of note for details)  review of external note(s) from 3+ sources (see separate area of note for details)  review of 3+ test result(s) ordered prior to this encounter (see separate area of note for details)  discussion of management or test interpretation with another health professional (see separate area of note for details)    The patient's management necessitated high risk (a decision regarding hospitalization).      Discharge Medication List as of 4/22/2023 11:25 PM          Final diagnoses:   Chest pain, unspecified type       4/22/2023   [facility]     Brenda Baird NP  04/23/23 0036       Brenda Baird NP  04/23/23 0038

## 2023-04-23 NOTE — DISCHARGE INSTRUCTIONS
Your troponin labs were elevated here today.  With your heart history you are at a high risk to develop a heart attack.  You verbalized understanding that having a heart attack is a lethal condition. We had a very detailed discussion about the benefits and risks of being admitted versus discharging.  You have opted to be discharged home.  If you redevelop any chest pain, shortness of breath, or other symptoms then I would recommend for you to return to the emergency room.

## 2023-04-23 NOTE — ED NOTES
Attempted to call RN at [facility] for nurse to nurse report and received no answer. Generic message left.  [phone]

## 2023-04-23 NOTE — ED NOTES
Writer called after hours number at [facility] and gave report to staff member Prerna due to RN not answering phone calls. Told staff that if RN wanted to call back at anytime, we would be happy to give report. Pt left via EMS back to [facility].

## 2023-04-23 NOTE — ED TRIAGE NOTES
Pt arrives via EMS with c/o chest pain that started at 1920 this evening and went away prior to EMS arrival. Staff reports that pt had been increasingly confused and urinating frequently today. EMS reported that facility completed a UA today and came back clean.      Triage Assessment     Row Name 04/22/23 2013       Triage Assessment (Adult)    Airway WDL WDL       Respiratory WDL    Respiratory WDL WDL       Skin Circulation/Temperature WDL    Skin Circulation/Temperature WDL WDL       Cardiac WDL    Cardiac WDL WDL       Peripheral/Neurovascular WDL    Peripheral Neurovascular WDL WDL       Cognitive/Neuro/Behavioral WDL    Cognitive/Neuro/Behavioral WDL WDL

## 2023-04-23 NOTE — ED NOTES
Writer attempted to call the on call RN at [facility], RN did not answer. Writer called after hours phone number for [facility] and attempted to tell staff that EMS is on their way to pick pt up and bring her back, staff stated that pt is not to be transferred back until nurse to nurse is completed. Writer told staff that we need to transfer pt back as soon as EMS arrives due to large delays in getting pt back. Staff stated they will attempt to get ahold of nurse.    Writer attempted to call RN a second time and was unable to get ahold of RN. Voicemail was left. Patient will be transported back to [facility] when EMS arrives.

## (undated) RX ORDER — AMLODIPINE BESYLATE 5 MG/1
TABLET ORAL
Status: DISPENSED
Start: 2023-02-15

## (undated) RX ORDER — CEFUROXIME AXETIL 250 MG/1
TABLET ORAL
Status: DISPENSED
Start: 2022-10-06

## (undated) RX ORDER — OLANZAPINE 2.5 MG/1
TABLET, FILM COATED ORAL
Status: DISPENSED
Start: 2023-02-11

## (undated) RX ORDER — ACETAMINOPHEN 500 MG
TABLET ORAL
Status: DISPENSED
Start: 2023-02-11

## (undated) RX ORDER — METOPROLOL TARTRATE 1 MG/ML
INJECTION, SOLUTION INTRAVENOUS
Status: DISPENSED
Start: 2023-02-15

## (undated) RX ORDER — ASPIRIN 325 MG
TABLET ORAL
Status: DISPENSED
Start: 2023-01-01